# Patient Record
Sex: FEMALE | Race: WHITE | NOT HISPANIC OR LATINO | Employment: UNEMPLOYED | URBAN - METROPOLITAN AREA
[De-identification: names, ages, dates, MRNs, and addresses within clinical notes are randomized per-mention and may not be internally consistent; named-entity substitution may affect disease eponyms.]

---

## 2021-11-24 ENCOUNTER — OFFICE VISIT (OUTPATIENT)
Dept: DERMATOLOGY | Facility: CLINIC | Age: 46
End: 2021-11-24
Payer: COMMERCIAL

## 2021-11-24 VITALS — WEIGHT: 170 LBS | TEMPERATURE: 98.8 F | BODY MASS INDEX: 25.76 KG/M2 | HEIGHT: 68 IN

## 2021-11-24 DIAGNOSIS — L73.9 FOLLICULITIS: ICD-10-CM

## 2021-11-24 DIAGNOSIS — L71.9 ROSACEA: ICD-10-CM

## 2021-11-24 DIAGNOSIS — L70.0 ACNE VULGARIS: ICD-10-CM

## 2021-11-24 DIAGNOSIS — L24.9 IRRITANT HAND DERMATITIS: Primary | ICD-10-CM

## 2021-11-24 PROCEDURE — 87205 SMEAR GRAM STAIN: CPT | Performed by: DERMATOLOGY

## 2021-11-24 PROCEDURE — 87070 CULTURE OTHR SPECIMN AEROBIC: CPT | Performed by: DERMATOLOGY

## 2021-11-24 PROCEDURE — 99204 OFFICE O/P NEW MOD 45 MIN: CPT | Performed by: DERMATOLOGY

## 2021-11-24 RX ORDER — AZELAIC ACID 0.15 G/G
AEROSOL, FOAM TOPICAL
Qty: 50 G | Refills: 3 | Status: SHIPPED | OUTPATIENT
Start: 2021-11-24 | End: 2022-01-25 | Stop reason: SDUPTHER

## 2021-11-24 RX ORDER — CLINDAMYCIN PHOSPHATE 10 UG/ML
LOTION TOPICAL 2 TIMES DAILY
Qty: 60 ML | Refills: 3 | Status: SHIPPED | OUTPATIENT
Start: 2021-11-24 | End: 2022-01-25 | Stop reason: SDUPTHER

## 2021-11-26 ENCOUNTER — TELEPHONE (OUTPATIENT)
Dept: DERMATOLOGY | Facility: CLINIC | Age: 46
End: 2021-11-26

## 2021-11-27 LAB
BACTERIA WND AEROBE CULT: ABNORMAL
GRAM STN SPEC: ABNORMAL

## 2022-01-25 ENCOUNTER — OFFICE VISIT (OUTPATIENT)
Dept: DERMATOLOGY | Facility: CLINIC | Age: 47
End: 2022-01-25
Payer: COMMERCIAL

## 2022-01-25 VITALS — TEMPERATURE: 99 F | WEIGHT: 180 LBS | HEIGHT: 68 IN | BODY MASS INDEX: 27.28 KG/M2

## 2022-01-25 DIAGNOSIS — L70.0 ACNE VULGARIS: ICD-10-CM

## 2022-01-25 DIAGNOSIS — L71.9 ROSACEA: ICD-10-CM

## 2022-01-25 DIAGNOSIS — L73.9 FOLLICULITIS: ICD-10-CM

## 2022-01-25 PROCEDURE — 87205 SMEAR GRAM STAIN: CPT | Performed by: DERMATOLOGY

## 2022-01-25 PROCEDURE — 87070 CULTURE OTHR SPECIMN AEROBIC: CPT | Performed by: DERMATOLOGY

## 2022-01-25 PROCEDURE — 87077 CULTURE AEROBIC IDENTIFY: CPT | Performed by: DERMATOLOGY

## 2022-01-25 PROCEDURE — 99213 OFFICE O/P EST LOW 20 MIN: CPT | Performed by: DERMATOLOGY

## 2022-01-25 RX ORDER — FLUTICASONE PROPIONATE 50 MCG
1 SPRAY, SUSPENSION (ML) NASAL DAILY
COMMUNITY

## 2022-01-25 RX ORDER — AZELAIC ACID 0.15 G/G
AEROSOL, FOAM TOPICAL
Qty: 50 G | Refills: 5 | Status: SHIPPED | OUTPATIENT
Start: 2022-01-25

## 2022-01-25 RX ORDER — CLINDAMYCIN PHOSPHATE 10 UG/ML
LOTION TOPICAL 2 TIMES DAILY
Qty: 60 ML | Refills: 5 | Status: SHIPPED | OUTPATIENT
Start: 2022-01-25

## 2022-01-25 NOTE — PATIENT INSTRUCTIONS
1  ACNE VULGARIS ("COMMON ACNE")        Assessment and Plan:   We reviewed the causes of acne, the kinds of acne, and the expected clinical course   We discussed treatment options ranging from over-the-counter products, topical retinoids, antibiotics, BP, hormonal therapies (OCPs/spironolactone), and isotretinoin (Accutane)   We reviewed specific over-the-counter interventions and medications  Recommended typical hygiene measures including water-based facial products, washing regularly with mild cleanser, and refraining from picking and popping any pimples   Recommended non-comedogenic sunscreen use daily   Expectations of therapy discussed  Side effects, risks and benefits of medications discussed   A comprehensive handout on Acne was provided   The phone number to call in case of questions or concerns (and instructions to stop medications in such a scenario) was provided   After lengthy discussion of etiology and treatment options, we decided to implement the following personalized treatment plan:    Based on a thorough discussion of this condition and the management approach to it (including a comprehensive discussion of the known risks, side effects and potential benefits of treatment), the patient (family) agrees to implement the following specific plan:    --------------------------------------------------------------------------------------  YOUR PERSONALIZED ACNE ACTION PLAN    [facility]    1) SKIN HYGIENE:  In the shower, wash your face, chest and back gently with Cetaphil moisturizing cleanser or Dove Fragrance-free bar  Do not use a luffa or washcloth as these tend to be too irritating to acne-prone skin  2) ANTIBIOTICS:     Continue using prescribed Finacea foam (Azelaic acid)  Apply topically once or twice a day to face area         2   FOLLICULITIS        Assessment and Plan:  Based on a thorough discussion of this condition and the management approach to it (including a comprehensive discussion of the known risks, side effects and potential benefits of treatment), the patient (family) agrees to implement the following specific plan:   Continue using benzoyl peroxide (neutrogena clear pore) in the shower daily or as needed   Continue using clindamycin 1% lotion  Apply topically after showering  Start using Amlactin ultra smoothing cream to rough dry areas as needed to moisturize      Obtained a wound culture of the nose to check for any bacterial growth (staph infection)  What is folliculitis? Folliculitis is the name given to a group of skin conditions in which there are inflamed hair follicles  The result is a tender red spot, often with a surface pustule  Folliculitis may be superficial or deep  It can affect anywhere there are hairs, including chest, back, buttocks, arms and legs  Acne and its variants are also types of folliculitis  What causes folliculitis? Folliculitis can be due to infection, occlusion (blockage), irritation and various skin diseases  Folliculitis due to infection  To determine if folliculitis is due to an infection, swabs should be taken from the pustules for cytology and culture in the laboratory  Bacteria  Bacterial folliculitis is commonly due to Staphylococcus aureus  If the infection involves the deep part of the follicle, it results in a painful boil  Recommended treatment includes careful hygiene, antiseptic cleanser or cream, antibiotic ointment, and/or oral antibiotics  Spa pool folliculitis is due to infection with Pseudomonas aeruginosa, which thrives in inadequately chlorinated warm water  Gram negative folliculitis is a pustular facial eruption also due to infection with Pseudomonas aeruginosa or other similar organisms  When it appears, it usually follows tetracycline treatment of acne, but is quite rare      Yeasts  The most common yeast to cause a folliculitis is Pityrosporum ovale, also known as Malassezia  Malassezia folliculitis (Pityrosporum folliculitis) is an itchy acne-like condition usually affecting the upper trunk of a young adult  Treatment includes avoiding moisturisers, stopping any antibiotics and topical antifungal or oral antifungal medication for several weeks  Candida albicans can also provoke a folliculitis in skin folds (intertrigo) or in the beard area  It is treated with topical or oral antifungal agents  Fungi  Ringworm of the scalp (tinea capitis) usually results in scaling and hair loss, but sometimes results in folliculitis  In [place], cat ringworm (Microsporum canis) is the commonest organism causing scalp fungal infection  Other fungi such as Trichophyton tonsurans are increasingly reported  Treatment is with oral antifungal agents for several months  Viral infections  Folliculitis may caused by herpes simplex virus  This tends to be tender, and resolves without treatment in around 10 days  Severe recurrent attacks may be treated with acyclovir and other antiviral agents  Herpes zoster (the cause of shingles) may also present as folliculitis with painful pustules and crusted spots within a dermatome (an area of skin supplied by a single nerve)  It is treated with hihg-dose acyclovir  Molluscum contagiosum, common in young children, may also cause follicular umbilicated papules, usually clustered in and around a body fold  Molluscum may provoke dermatitis  Parasitic infection  Folliculitis on the face or scalp of older or immunosuppressed adults may be due to colonisation by hair follicle mites (demodex)  This is known as demodicosis  The human infestation, scabies, often provokes folliculitis, as well as non-follicular papules, vesicles and pustules  Folliculitis due to irritation from regrowing hairs  Folliculitis may arise as hairs regrow after shaving, waxing, electrolysis or plucking   Swabs taken from the pustules are sterile ie there is no growth of bacteria or other organisms  In the beard area irritant folliculitis is known as pseudofolliculitis barbae  Irritant folliculitis is also common on the lower legs of women (shaving rash)  It is frequently very itchy  Treatment is by stopping hair removal, and not beginning again for about three months after the folliculitis has settled  To prevent reoccurring irritant folliculitis, use a gentle hair removal method, such as a lady's electric razor  Avoid soap and apply plenty of shaving gel, if using a blade shaver  Folliculitis due to contact reactions  Occlusion  Paraffin-based ointments, moisturisers, and adhesive plasters may all result in a sterile folliculitis  If a moisturiser is needed, choose an oil-free product, as it is less likely to cause occlusion  Chemicals  Coal tar, cutting oils and other chemicals may cause an irritant folliculitis  Avoid contact with the causative product  Topical steroids  Overuse of topical steroids may produce a folliculitis  Perioral dermatitis is a facial folliculitis provoked by moisturisers and topical steroids  Perioral dermatitis is treated with tetracycline antibiotics for six weeks or so  Folliculitis due to immunosuppression  Eosinophilic folliculitis is a specific type of folliculitis that may arise in some immune suppressed individuals such as those infected by human immunodeficiency virus (HIV) or those who have cancer  Folliculitis due to drugs  Folliculitis may be due to drugs, particularly corticosteroids (steroid acne), androgens (male hormones), ACTH, lithium, isoniazid (INH), phenytoin and B-complex vitamins  Protein kinase inhibitors (epidermal growth factor receptor inhibitors) and targeted therapy for metastatic melanoma (vemurafenib, dabrafenib) nearly always result in folliculitis      Folliculitis due to inflammatory skin diseases  Certain uncommon inflammatory skin diseases may cause permanent hair loss and scarring because of deep seated sterile folliculitis  These include:   Lichen planus   Discoid lupus erythematosus   Folliculitis decalvans   Folliculitis keloidalis     Treatment depends on the underlying condition and its severity  A skin biopsy is often necessary to establish the diagnosis  Acne variants   Acne and acne-like or acneform disorders are also forms of folliculitis  These include:   Acne vulgaris   Nodulocystic acne   Rosacea   Scalp folliculitis   Chloracne    The follicular occlusion syndrome refers to:   Hidradenitis suppurativa (acne inversa)   Acne conglobata (a severe form of nodulocystic acne)   Dissecting cellulitis (perifolliculitis capitis abscedens et suffodiens)   Pilonidal sinus  Treatment of the acne variants may include topical therapy as well as long courses of tetracycline antibiotics, isotretinoin (vitamin-A derivative) and in women, antiandrogenic therapy  Buttock folliculitis  Folliculitis affecting the buttocks is quite common and is often nonspecific, ie no specific cause is found  Buttock folliculitis is equally common in males and females   Acute buttock folliculitis is usually bacterial in origin (like boils), resulting in red painful papules and pustules  It clears with antibiotics   Chronic buttock folliculitis does not often cause significant symptoms but it can be very persistent  Although antiseptics, topical acne treatments, peeling agents such as alphahydroxy acids, long courses of oral antibiotics and isotretinoin can help buttock folliculitis, they are not always effective  Hair removal might be worth trying if the affected area is hairy   As regrowth of hair can make it worse, permanent hair reduction by laser or intense pulsed light (IPL) is best

## 2022-01-27 LAB
BACTERIA WND AEROBE CULT: ABNORMAL
GRAM STN SPEC: ABNORMAL

## 2022-01-27 NOTE — RESULT ENCOUNTER NOTE
I discussed that culture grew a gram negative  Typically contamainat     BP wash recommended    I did note that re[eated  cultures may help to see if any pattern of gram negative  etc

## 2022-05-17 ENCOUNTER — OFFICE VISIT (OUTPATIENT)
Dept: DERMATOLOGY | Facility: CLINIC | Age: 47
End: 2022-05-17
Payer: COMMERCIAL

## 2022-05-17 VITALS — HEIGHT: 68 IN | WEIGHT: 182 LBS | BODY MASS INDEX: 27.58 KG/M2 | TEMPERATURE: 97.6 F

## 2022-05-17 DIAGNOSIS — L73.9 FOLLICULITIS: ICD-10-CM

## 2022-05-17 DIAGNOSIS — L70.0 ACNE VULGARIS: Primary | ICD-10-CM

## 2022-05-17 PROCEDURE — 99213 OFFICE O/P EST LOW 20 MIN: CPT | Performed by: DERMATOLOGY

## 2022-05-17 RX ORDER — CLINDAMYCIN PHOSPHATE 10 MG/ML
1 SOLUTION TOPICAL 2 TIMES DAILY
Qty: 30 PAD | Refills: 5 | Status: SHIPPED | OUTPATIENT
Start: 2022-05-17

## 2022-05-17 RX ORDER — DOXYCYCLINE HYCLATE 20 MG
TABLET ORAL
Qty: 60 TABLET | Refills: 2 | Status: SHIPPED | OUTPATIENT
Start: 2022-05-17 | End: 2022-06-09

## 2022-05-17 NOTE — PATIENT INSTRUCTIONS
ACNE VULGARIS ("COMMON ACNE")    Assessment and Plan: We reviewed the causes of acne, the kinds of acne, and the expected clinical course  We discussed treatment options ranging from over-the-counter products, topical retinoids, antibiotics, BP, hormonal therapies (OCPs/spironolactone), and isotretinoin (Accutane)  We reviewed specific over-the-counter interventions and medications  Recommended typical hygiene measures including water-based facial products, washing regularly with mild cleanser, and refraining from picking and popping any pimples  Recommended non-comedogenic sunscreen use daily  Expectations of therapy discussed  Side effects, risks and benefits of medications discussed  A comprehensive handout on Acne was provided  The phone number to call in case of questions or concerns (and instructions to stop medications in such a scenario) was provided  After lengthy discussion of etiology and treatment options, we decided to implement the following personalized treatment plan:    Based on a thorough discussion of this condition and the management approach to it (including a comprehensive discussion of the known risks, side effects and potential benefits of treatment), the patient (family) agrees to implement the following specific plan:    --------------------------------------------------------------------------------------  YOUR PERSONALIZED ACNE ACTION PLAN    [address] [place] [address] Pkw[address]:  In the shower, wash your face, chest and back gently with Cetaphil moisturizing cleanser or Dove Fragrance-free bar  Do not use a luffa or washcloth as these tend to be too irritating to acne-prone skin  ANTIMICROBIAL BENZOYL PEROXIDE:    Neutrogena Clear Pore (Benzoyl Peroxide 3% wash): In the shower, apply this medication to your face, chest and back  Leave this wash on your skin for about 5 minutes and then rinse it off completely while in the shower   If you do not rinse it off completely, then it will bleach your towels or clothing  This medication is now available without prescription (over-the-counter) in most drug stores or at [email or web address] for about $7 a bottle  PenOxyl wash: Apply on face leave in for 1-2 minutes and completely rinse off    Sulfacetamide sodium-sulfur wash: Apply to face daily  You may use any brand of benzoyl peroxide 10% wash over the counter    ANTIBIOTICS:    Doxycycline Take 1 tablet with a full glass of water and food    Consider Spironolactone if acne persists    EVENING ROUTINE    SKIN HYGIENE:  In the shower, wash your face, chest and back gently with Cetaphil moisturizing cleanser or Dove Fragrance-free bar  Do not use a lufa or washcloth as these tend to be too irritating to acne-prone skin  ANTIBIOTICS:    Doxycycline Take 1 tablet with a full glass of water and food     3)  Continue with applying Finacea 15 % foam once to twice daily    ACNE:  WHAT ZIT ALL ABOUT? WHY DO I HAVE ACNE/PIMPLES? Your skin is made of layers  To keep the skin from becoming dry and cracked, the skin needs oil  The oil is made in little wells in the deeper layers in the skin  People with acne have glands that make more oil and are more easily plugged, causing the glands to swell  Hormones, bacteria and your inherited tendency to have acne all play a role  The medical term for pimples is acne or acne vulgaris (vulgaris means common)  Most people get some acne  Acne does not come from being dirty  Instead, it is an expected consequence of changes that occur during normal growth and development  Hormones, bacteria, and your family's tendency to have acne may all play a role  Whiteheads or blackheads are openings of the glands (glands are the oil factories) onto the surface of the skin  Blackheads are not caused by dirt blocking the pores; instead, they result from the oxidation reaction of oil and skin in the pores with the air (like a rust reaction)  WHAT ABOUT STRESS? Stress does not cause acne but it can make it worse  Make sure you get enough sleep and daily exercise! WHAT ABOUT FOODS/DIET? Try to eat a balanced, healthy diet  Some people feel that certain foods worsen their acne  While there aren't many studies available on this question, severe dietary changes are unlikely to help your acne and may be harmful to the health of your skin  If you find that a certain food seems to aggravate your acne, you may consider avoiding that food  Discuss this with your physician! WHAT CAUSES MY ACNE? There are four contributors to acne--the body's natural oil (sebum), clogged pores, bacteria (with the scientific name Propionibacterium acnes, or P  acnes, for short), and the body's reaction to the bacteria living in the clogged pores (which causes inflammation)  Here's what happens:    Sebum is produced in the normal oil-making glands in the deeper layers of the skin and reaches the surface through the skin's pores  An increase in certain hormones occurs around the time of puberty, and these hormones trigger the oil glands to produce increased amounts of sebum  Pores with excess oil tend to become clogged more easily  At the same time, P  acnes--one of the many types of bacteria that normally live on everyone's skin--thrives in the excess oil and causes a skin reaction (inflammation)  If a pore is clogged close to the surface, there is little inflammation  However, this results in the formation of whiteheads (closed comedones) or blackheads (open comedones) at the surface of the skin  A plug that extends to, or forms a little deeper in the pore, or one that enlarges or ruptures may cause more inflammation  The result is red bumps (papules) and pus-filled pimples (pustules)  If plugging happens in the deepest skin layer, the inflammation may be even more severe, resulting in the formation of nodules or cysts   When these types of acne heal, they may leave behind discolored areas or true scars  SKIN HYGIENE:  HOW SHOULD I [facility] MY SKIN? Acne does not come from being dirty, however, washing your face is part of taking good care of your skin and will help keep your face clear  Good skin hygiene is, therefore, critical to support any acne treatment plan  Here are several specific suggestions for practicing good skin hygiene and keeping your skin looking its best:    You should wash acne-prone skin TWICE A DAY: Once in the morning and once in the evening  This does include any showers you take that day, so do not overdo it! Do not scrub the skin with a washcloth or loofah as these can irritate and inflame your acne  Acne does not come from dirt, so it is not necessary to scrub the skin clean  In fact, scrubbing may lead to dryness and irritation that makes the acne even worse and harder for patients to tolerate acne medications  Use a gentle facial moisturizing cleanser (Cetaphil Moisturizing Cleanser or Dove Fragrance-Free bar)  Avoid using soaps like Miracle Roman, [address], [address], or soft/liquid soaps as these products will dry your skin  Do not use any over-the-counter acne washes without your doctor's specific instruction to do so  These products often contain salicylic acid or benzoyl peroxide  These ingredients can be helpful in clearing oil from the skin and reducing bacteria, but they may also be drying and can add to irritation  Do not use exfoliating products with microbeads or brushes as these can cause irritation to the skin  Facials and other treatments to remove, squeeze, or clean out pores are not recommended  Manipulating the skin in this way can make acne worse and can lead to severe infections and/or scarring  It also increases the likelihood that the skin will not be able to tolerate acne medications     Try not to pop pimples or pick at your acne as this can delay healing and may result in scarring or skin color changes (dark spots) that are often more noticeable than the acne itself  Picking/popping acne can also cause a serious skin infection  Wash or change your pillow case once to twice a week, especially if you use products in your hair  Wash the skin as soon as possible after playing sports or other activities that cause a lot of sweating  Also, pay attention to how your sports equipment (shoulder pads, helmet strap, etc ) might be making your acne worse  When you use makeup, moisturizer, or sunscreen make sure that these products are labeled non-comedogenic, or won't clog pores, or won't cause acne         SHOULD I TREAT MY ACNE? There are a number of other skin conditions that can look like acne  If there is any question about the diagnosis, then the person should be evaluated by a board certified pediatric and adolescent dermatologist   A physician should examine any child with acne who is between the ages of 3and 9years of age, as acne in this mid-childhood age group is not normal and may signal an underlying problem  If a preadolescent (9to 6years of age) or adolescent (15to 25years of age) has mild acne and the condition is not bothersome to the individual, proper and regular skin care (what your doctor may call skin hygiene) may be all that is needed at this point  Many people do, however, need specific acne medications to help their skin look and feel its best  Your doctor will tell you if you are one of these people  If so, you may be advised to use an over-the-counter or prescription medication that is applied to the skin (a topical medication) or if the addition of an oral medication (a medication taken by [employer]) is needed  The good news is that the medications work well when used properly! Some specific factors that may influence the choice of acne therapy include:    Severity   The number and type of skin lesions (papules or comedones) and the degree of inflammation (mild, moderate or severe)  Scarring  Scarring is most common when acne is severe, but it can happen even in children with mild acne  Impact  If a child is experiencing emotional complications because of the acne or is experiencing negative comments from other children  Cost of the acne medications  An acne expert can help to keep out of pocket costs to a minimum by utilizing the correct medications and the least expensive options  The patient's skin type (oily versus dry or combination skin, for example)  Potential side effects of the medication  The ease or overall complexity of the treatment plan or medication  WHAT ACNE TREATMENTS ARE AVAILABLE? Medications for acne try to stop the formation of new pimples by reducing or removing the oil, bacteria, and other things (like dead skin cells) that clog the pores  They can also decrease the inflammation or irritation response of the skin to bacteria  It may take from 6 to 8 weeks (about 2 months!) before you see any improvement and know if the medication is effective  It takes the layers of skin this long to regenerate  Remember, these medications do not cure the condition--the acne improves because of the medication  Therefore, treatment must be continued in order to prevent the return of acne lesions  There are many types of acne treatments  Some are applied to the skin (topical medications) and some are taken by mouth (oral medications)  In most cases of mild acne, the doctor will start with a topical medication  There are many different topical medications that are helpful for acne  If acne is more severe and it does not respond adequately to a topical medication, or if it covers large body surface areas such as the back and/or chest, oral antibiotics such as Doxycycline or Minocycline and/or oral hormone therapy such as Oral Contraceptive Pills or Spironolactone may be prescribed   In the most severe cases, isotretinoin (Accutane) may be used  In general, it is usually best to start with acne medications that are least likely to cause side effects but are at the same time capable of addressing the specific causes for the acne  Some patients have a good result with just one medication, but many will need to use a combination of treatments: two or more different topical agents or an oral medication plus a topical medication  Another treatment used for acne may include corticosteroid injections, which are used to help relieve pain, decrease the size, and encourage the healing of large, inflamed acne nodules  Also, dermatologists sometimes perform acne surgery, using a fine needle, a pointed blade, or an instrument known as a comedone extractor to mechanically clean out clogged pores  One must always weigh the risk for inducing a scar with the potential benefits of any procedure  Prior treatment with topical retinoids can loosen whiteheads and blackheads and make it easier to physically remove such lesions  Heat-based devices, and light and laser therapy are being studied to see whether there is any role for such treatments in mild to moderate acne  At this time, there is not enough evidence to make general recommendations about their use  TOPICAL ACNE MEDICATIONS    WHAT KIND OF TOPICALS ARE THERE? Benzoyl peroxide (BP) helps to fight inflammation and is anti-microbial (kills bacteria, viruses, and other microorganisms) and is believed to help prevent resistance of bacteria to topical antibiotics  A benzoyl peroxide wash may be recommended for use on large areas such as the chest and/or back  Mild irritation and dryness are common when first using benzoyl peroxide-containing products  Be careful because benzoyl peroxide can bleach towels and clothing! Retinoids (such as adapalene, tretinoin, or tazarotene) unplug the oil glands by helping peel away the layers of skin and other things plugging the opening of the glands  Mild irritation and dryness are common when first using these products  Facial waxing and other skin procedures can lead to excessive irritation and should be avoided during retinoid therapy  Antibiotics fight bacteria and help decrease inflammation  Topical antibiotics commonly used in acne include clindamycin, erythromycin, and combination agents (such as clindamycin/benzoyl peroxide or erythromycin/benzoyl peroxide)  Mild irritation and dryness are common when first using these products  Typically, topical antibiotics should not be used alone as treatment for acne  Other topical agents include salicylic acid, azelaic acid, dapsone, and sulfacetamide  Mild irritation and dryness can also occur when first using these products  USING YOUR TOPICAL TREATMENTS LIKE A PRO  Apply topical medications only to clean, dry skin  Topical medications may lead to significant dryness of the affected areas  To minimize this, wait 15-20 minutes after washing before applying your topical medication  These medications work deep in the skin to prevent new breakouts  Spot treatment of individual pimples does not do much  When applying topical medications to the face, use the 5-dot method  Start by placing a small pea-sized amount of the medication on your finger  Then, place dots in each of five locations of your face: Mid-forehead, each cheek, nose, and chin  Next, rub the medication into the entire area of skin - not just on individual pimples! Try to avoid the delicate skin around your eyes and corners of your mouth  The medications are not magic! They take weeks if not months to work  Be patient and use your medicine on a daily basis or as directed for six weeks before asking if your skin looks better  Try not to miss more than one or two days each week when using your medications  If you are starting a new medication, then try using it every other night or even every third night   Gradually work up to [employer] a day   This will give your skin time to adjust   The same medications often come in various forms or formulations: Creams, ointments, lotions, gels, microspheres, or foams  Use the formulation that has been recommended and don't switch to other forms unless instructed  Some forms (such as alcohol based gels) may be more drying and less tolerable for certain skin types  Sometimes individual medications are not as effective as a combination of two or more agents  The doctor may need to try several medications or combinations before finding the one that is best for that patient  Moisturizer, sunscreen, and make-up may be used in conjunction with topical acne medications  In general, acne medications are applied first so they may directly contact the skin  Ask your physician to review specific application instructions! It is especially important to always use sunscreen when using a topical retinoid or oral antibiotic  These drugs can make your skin more sensitive to the sun  In general, sunscreen gets applied AFTER any acne medications  Don't stop using your acne medications just because your acne got better  Remember, the acne is better because of the medication, and prevention is the nelson to treatment  ORAL ACNE MEDICATIONS    ORAL ANTIBIOTICS  Antibiotics include tetracycline-class medicines (which include the most commonly used oral antibiotics for acne, minocycline, and doxycycline), erythromycin, trimethoprim-sulfamethoxazole, and occasionally cephalexin or azithromycin  These drugs may decrease bacteria and inflammation, and they are most effective for moderate-to-severe inflammatory acne  A product containing benzoyl peroxide should be used along with these antibiotics to help decrease the possibility of microbial resistance  Always take your acne pills with lots of water! A pill stuck in your throat can cause significant burning and irritation     Drink a full glass of water to ensure the pill gets into your stomach  Avoid popping a pill right before bed, and stay upright for at least 1 hour after taking a pill  DOXYCYCLINE   This medication is usually taken ONCE or TWICE per day, as instructed by your physician  NOTE: Always take this medication with lots of water! A pill stuck in the throat can cause significant burning and irritation  Avoid popping a pill right before bed & stay upright for at least one hour after taking a pill  WARNING: Doxycycline increases your sensitivity to the sun, so practice excellent sun protection! If you notice any of the following, stop using the medication and notify your health care provider: headaches; blurred vision; dizziness; sun sensitivity; heartburn-stomach pain; irritation of the esophagus; darkening of scars, gums, or teeth (more often with minocycline); nail changes; yellowing of the eyes or skin (indicating possible liver disease); joint pains-and flu-like symptoms  Taking oral antibiotics with food may help with symptoms of upset stomach  COMMON SIDE EFFECTS: Headaches; dizziness; sun sensitivity; irritation of the throat; discoloration of scars, gums, or teeth; nail changes  HAVING PROBLEMS WITH ANY OF YOUR TREATMENTS? You should not be able to see any of the medicines on your face  If you can see a white film on your skin after you apply the medication, there is too much medicine in that area and you need to apply a thinner coat and make sure it is spread evenly on your face  If your skin gets too dry, you can apply a light (non-comedogenic) moisturizer on top of your medicine or you may switch to using the medicine every other day instead of every day  If your skin is still too irritated, you may need to switch to a milder medication  If your skin is red and very itchy, you may be allergic to the medication and you should stop using it        COMMON POSSIBLE SIDE EFFECTS OF MEDICATIONS    Retinoids - dryness, redness, increased sun sensitivity  Benzoyl peroxide - drying, redness, bleaching of clothes, towels and sheets, allergy  Doxycycline - headaches; dizziness; irritation of the throat; nail changes; discoloration of teeth  Sun sensitivity - even if you have dark skin, this medicine can make you burn more easily  Make sure you protect yourself from the sun, either by avoiding being outside between 11 AM and 3 PM, wearing and reapplying sunscreen/sunblock, or wearing sun protective clothing  Nausea/vomiting - if you experience nausea with this medication, take it with food  WHEN AND WHERE TO CALL WITH CONCERNS  We are here to help! If you experience any unusual symptoms, then stop taking or using the medication and call our office at [phone] (SKIN)  It is better to be safe than to be sorry! FOLLICULITIS    Assessment and Plan:  Based on a thorough discussion of this condition and the management approach to it (including a comprehensive discussion of the known risks, side effects and potential benefits of treatment), the patient (family) agrees to implement the following specific plan:  Continue to apply clindamycin lotion twice daily to affected areas    What is folliculitis? Folliculitis is the name given to a group of skin conditions in which there are inflamed hair follicles  The result is a tender red spot, often with a surface pustule  Folliculitis may be superficial or deep  It can affect anywhere there are hairs, including chest, back, buttocks, arms and legs  Acne and its variants are also types of folliculitis  What causes folliculitis? Folliculitis can be due to infection, occlusion (blockage), irritation and various skin diseases  Folliculitis due to infection  To determine if folliculitis is due to an infection, swabs should be taken from the pustules for cytology and culture in the laboratory  Bacteria  Bacterial folliculitis is commonly due to Staphylococcus aureus   If the infection involves the deep part of the follicle, it results in a painful boil  Recommended treatment includes careful hygiene, antiseptic cleanser or cream, antibiotic ointment, and/or oral antibiotics  Spa pool folliculitis is due to infection with Pseudomonas aeruginosa, which thrives in inadequately chlorinated warm water  Gram negative folliculitis is a pustular facial eruption also due to infection with Pseudomonas aeruginosa or other similar organisms  When it appears, it usually follows tetracycline treatment of acne, but is quite rare  Yeasts  The most common yeast to cause a folliculitis is Pityrosporum ovale, also known as Malassezia  Malassezia folliculitis (Pityrosporum folliculitis) is an itchy acne-like condition usually affecting the upper trunk of a young adult  Treatment includes avoiding moisturisers, stopping any antibiotics and topical antifungal or oral antifungal medication for several weeks  Candida albicans can also provoke a folliculitis in skin folds (intertrigo) or in the beard area  It is treated with topical or oral antifungal agents  Fungi  Ringworm of the scalp (tinea capitis) usually results in scaling and hair loss, but sometimes results in folliculitis  In [place], cat ringworm (Microsporum canis) is the commonest organism causing scalp fungal infection  Other fungi such as Trichophyton tonsurans are increasingly reported  Treatment is with oral antifungal agents for several months  Viral infections  Folliculitis may caused by herpes simplex virus  This tends to be tender, and resolves without treatment in around 10 days  Severe recurrent attacks may be treated with acyclovir and other antiviral agents  Herpes zoster (the cause of shingles) may also present as folliculitis with painful pustules and crusted spots within a dermatome (an area of skin supplied by a single nerve)  It is treated with hihg-dose acyclovir    Molluscum contagiosum, common in young children, may also cause follicular umbilicated papules, usually clustered in and around a body fold  Molluscum may provoke dermatitis  Parasitic infection  Folliculitis on the face or scalp of older or immunosuppressed adults may be due to colonisation by hair follicle mites (demodex)  This is known as demodicosis  The human infestation, scabies, often provokes folliculitis, as well as non-follicular papules, vesicles and pustules  Folliculitis due to irritation from regrowing hairs  Folliculitis may arise as hairs regrow after shaving, waxing, electrolysis or plucking  Swabs taken from the pustules are sterile ie there is no growth of bacteria or other organisms  In the beard area irritant folliculitis is known as pseudofolliculitis barbae  Irritant folliculitis is also common on the lower legs of women (shaving rash)  It is frequently very itchy  Treatment is by stopping hair removal, and not beginning again for about three months after the folliculitis has settled  To prevent reoccurring irritant folliculitis, use a gentle hair removal method, such as a lady's electric razor  Avoid soap and apply plenty of shaving gel, if using a blade shaver  Folliculitis due to contact reactions  Occlusion  Paraffin-based ointments, moisturisers, and adhesive plasters may all result in a sterile folliculitis  If a moisturiser is needed, choose an oil-free product, as it is less likely to cause occlusion  Chemicals  Coal tar, cutting oils and other chemicals may cause an irritant folliculitis  Avoid contact with the causative product  Topical steroids  Overuse of topical steroids may produce a folliculitis  Perioral dermatitis is a facial folliculitis provoked by moisturisers and topical steroids  Perioral dermatitis is treated with tetracycline antibiotics for six weeks or so      Folliculitis due to immunosuppression  Eosinophilic folliculitis is a specific type of folliculitis that may arise in some immune suppressed individuals such as those infected by human immunodeficiency virus (HIV) or those who have cancer  Folliculitis due to drugs  Folliculitis may be due to drugs, particularly corticosteroids (steroid acne), androgens (male hormones), ACTH, lithium, isoniazid (INH), phenytoin and B-complex vitamins  Protein kinase inhibitors (epidermal growth factor receptor inhibitors) and targeted therapy for metastatic melanoma (vemurafenib, dabrafenib) nearly always result in folliculitis  Folliculitis due to inflammatory skin diseases  Certain uncommon inflammatory skin diseases may cause permanent hair loss and scarring because of deep seated sterile folliculitis  These include:  Lichen planus  Discoid lupus erythematosus  Folliculitis decalvans  Folliculitis keloidalis     Treatment depends on the underlying condition and its severity  A skin biopsy is often necessary to establish the diagnosis  Acne variants   Acne and acne-like or acneform disorders are also forms of folliculitis  These include:  Acne vulgaris  Nodulocystic acne  Rosacea  Scalp folliculitis  Chloracne    The follicular occlusion syndrome refers to:  Hidradenitis suppurativa (acne inversa)  Acne conglobata (a severe form of nodulocystic acne)  Dissecting cellulitis (perifolliculitis capitis abscedens et suffodiens)  Pilonidal sinus  Treatment of the acne variants may include topical therapy as well as long courses of tetracycline antibiotics, isotretinoin (vitamin-A derivative) and in women, antiandrogenic therapy  Buttock folliculitis  Folliculitis affecting the buttocks is quite common and is often nonspecific, ie no specific cause is found  Buttock folliculitis is equally common in males and females  Acute buttock folliculitis is usually bacterial in origin (like boils), resulting in red painful papules and pustules  It clears with antibiotics  Chronic buttock folliculitis does not often cause significant symptoms but it can be very persistent   Although antiseptics, topical acne treatments, peeling agents such as alphahydroxy acids, long courses of oral antibiotics and isotretinoin can help buttock folliculitis, they are not always effective  Hair removal might be worth trying if the affected area is hairy   As regrowth of hair can make it worse, permanent hair reduction by laser or intense pulsed light (IPL) is best

## 2022-05-17 NOTE — PROGRESS NOTES
[facility] Follow Up Note    Patient Name: Mikey Mckay  Encounter Date: 05/17/22    Today's Chief Concerns:  [place] Concern #1:  FOLLOW UP TO ACNE   Concern#2: Mole check      Current Medications:    Current Outpatient Medications:     amphetamine-dextroamphetamine (ADDERALL XR) 10 MG 24 hr capsule, dextroamphetamine-amphetamine ER 10 mg 24hr capsule,extend release, Disp: , Rfl:     Azelaic Acid (Finacea) 15 % FOAM, Apply topically once or twice a day to a face area , Disp: 50 g, Rfl: 5    clindamycin (CLEOCIN T) 1 % lotion, Apply topically 2 (two) times a day Apply topically to affected areas twice a day , Disp: 60 mL, Rfl: 5    fluticasone (FLONASE) 50 mcg/act nasal spray, 1 spray into each nostril daily Using as needed, Disp: , Rfl:     Junel FE 1 5/30 1 5-30 MG-MCG tablet, ONE PILL DAILY, SKIP THE INACTIVE PILLS AND RESTART A NEW PACK IMMEDIATELY, Disp: , Rfl:     levothyroxine 50 mcg tablet, levothyroxine 50 mcg tablet, Disp: , Rfl:     Multiple Vitamin (MULTI-VITAMIN DAILY PO), Take by mouth, Disp: , Rfl:     CONSTITUTIONAL:   Vitals:    05/17/22 1459   Temp: 97 6 °F (36 4 °C)   TempSrc: Temporal   Weight: 82 6 kg (182 lb)   Height: 5' 8" (1 727 m)     Specific Alerts:    Have you been seen by a [facility]ologist in the last 3 years? YES    Are you pregnant or planning to become pregnant? No    Are you currently or planning to be nursing or breast feeding? No    Allergies   Allergen Reactions    Benzoyl Peroxide Rash     Other reaction(s): itching, swelling  Other reaction(s): itching, swelling         May we call your Preferred Phone number to discuss your specific medical information? YES    May we leave a detailed message that includes your specific medical information? YES    Have you traveled outside of the [facility] in the past 3 months? No    Do you currently have a pacemaker or defibrillator?  No    Do you have any artificial heart valves, joints, plates, screws, rods, stents, pins, etc? No   - If Yes, were any placed within the last 2 years? Do you require any medications prior to a surgical procedure? No    Are you taking any medications that cause you to bleed more easily ("blood thinners") No    Have you ever experienced a rapid heartbeat with epinephrine? No    Have you ever been treated with "gold" (gold sodium thiomalate) therapy? No    [facility] can help with wrinkles, "laugh lines," facial volume loss, "double chin," "love handles," age spots, and more  Are you interested in learning today about some of the skin enhancement procedures that we offer? (If Yes, please provide more detail) No    Review of Systems:  Have you recently had or currently have any of the following?     · Fever or chills: No  · Night Sweats: YES, thyroid  · Headaches: YES  · Weight Gain: YES  · Weight Loss: No  · Blurry Vision: YES  · Nausea: No  · Vomiting: No  · Diarrhea: No  · Blood in Stool: No  · Abdominal Pain: No  · Itchy Skin: YES  · Painful Joints: No  · Swollen Joints: No  · Muscle Pain: No  · Irregular Mole: No  · Sun Burn: No  · Dry Skin: YES  · Skin Color Changes: No  · Scar or Keloid: No  · Cold Sores/Fever Blisters: No  · Bacterial Infections/MRSA: No  · Anxiety: No  · Depression: No  · Suicidal or Homicidal Thoughts: No      PSYCH: Normal mood and affect  EYES: Normal conjunctiva  ENT: Normal lips and oral mucosa  CARDIOVASCULAR: No edema  RESPIRATORY: Normal respirations  HEME/LYMPH/IMMUNO:  No regional lymphadenopathy except as noted below in ASSESSMENT AND PLAN BY DIAGNOSIS    FULL ORGAN SYSTEM SKIN EXAM (SKIN)   Hair, Scalp, Ears, Face Normal except as noted below in Assessment   Neck, Cervical Chain Nodes Normal except as noted below in Assessment   Right Arm/Hand/Fingers Normal except as noted below in Assessment   Left Arm/Hand/Fingers Normal except as noted below in Assessment   Chest/Axillae Viewed areas Normal except as noted below in Assessment   Abdomen, Umbilicus Normal except as noted below in Assessment   Back/Spine Normal except as noted below in Assessment   Buttocks Viewed areas Normal except as noted below in Assessment   Right Leg, Foot, Toes Normal except as noted below in Assessment   Left Leg, Foot, Toes Normal except as noted below in Assessment       ACNE VULGARIS ("COMMON ACNE")    Physical Exam:   Psychiatric/Mood:   Anatomic Location Affected: Face T-zone area   Morphological Description:  o Open/Closed Comedones:  - No evidence ("Clear")  o Inflammatory Papules/Pustules:  - Several ("Moderate")  o Nodules:  - No evidence ("Clear")  o Scarring:  - Several ("Moderate")  o Excoriations:  - No evidence ("Clear")  o Local Skin Redness/Erythema:  - No evidence ("Clear")  o Local Skin Dryness/Scaling:  - No evidence ("Clear")  o Local Skin Dyspigmentation:  - No evidence ("Clear")   Pertinent Positives:   Pertinent Negatives: Additional History of Present Condition:  Patient using Finacea 15 % foam     Assessment and Plan:   We reviewed the causes of acne, the kinds of acne, and the expected clinical course   We discussed treatment options ranging from over-the-counter products, topical retinoids, antibiotics, BP, hormonal therapies (OCPs/spironolactone), and isotretinoin (Accutane)   We reviewed specific over-the-counter interventions and medications  Recommended typical hygiene measures including water-based facial products, washing regularly with mild cleanser, and refraining from picking and popping any pimples   Recommended non-comedogenic sunscreen use daily   Expectations of therapy discussed  Side effects, risks and benefits of medications discussed   A comprehensive handout on Acne was provided   The phone number to call in case of questions or concerns (and instructions to stop medications in such a scenario) was provided     After lengthy discussion of etiology and treatment options, we decided to implement the following personalized treatment plan:    Based on a thorough discussion of this condition and the management approach to it (including a comprehensive discussion of the known risks, side effects and potential benefits of treatment), the patient (family) agrees to implement the following specific plan:    --------------------------------------------------------------------------------------  YOUR PERSONALIZED ACNE ACTION PLAN    [facility]    1) SKIN HYGIENE:  In the shower, wash your face, chest and back gently with Cetaphil moisturizing cleanser or Dove Fragrance-free bar  Do not use a luffa or washcloth as these tend to be too irritating to acne-prone skin  2) ANTIMICROBIAL BENZOYL PEROXIDE:   None   Neutrogena Clear Pore (Benzoyl Peroxide 3% wash): In the shower, apply this medication to your face, chest and back  Leave this wash on your skin for about 5 minutes and then rinse it off completely while in the shower  If you do not rinse it off completely, then it will bleach your towels or clothing  This medication is now available without prescription (over-the-counter) in most drug stores or at [employer] for about $7 a bottle   PenOxyl wash: Apply on face leave in for 1-2 minutes and completely rinse off     Sulfacetamide sodium-sulfur wash: Apply to face daily   You may use any brand of benzoyl peroxide 10% wash over the counter    3) ANTIBIOTICS:     Doxycycline Take 1 tablet with a full glass of water and food     Consider Spironolactone if acne persists    EVENING ROUTINE    1) SKIN HYGIENE:  In the shower, wash your face, chest and back gently with Cetaphil moisturizing cleanser or Dove Fragrance-free bar  Do not use a lufa or washcloth as these tend to be too irritating to acne-prone skin      2) ANTIBIOTICS:     Doxycycline Take 1 tablet with a full glass of water and food     3)   Continue with applying Finacea 15 % foam once to twice daily    ACNE:  WHAT ZIT ALL ABOUT? WHY DO I HAVE ACNE/PIMPLES? Your skin is made of layers  To keep the skin from becoming dry and cracked, the skin needs oil  The oil is made in little wells in the deeper layers in the skin  People with acne have glands that make more oil and are more easily plugged, causing the glands to swell  Hormones, bacteria and your inherited tendency to have acne all play a role  The medical term for pimples is acne or acne vulgaris (vulgaris means common)  Most people get some acne  Acne does not come from being dirty  Instead, it is an expected consequence of changes that occur during normal growth and development  Hormones, bacteria, and your family's tendency to have acne may all play a role  Whiteheads or blackheads are openings of the glands (glands are the oil factories) onto the surface of the skin  Blackheads are not caused by dirt blocking the pores; instead, they result from the oxidation reaction of oil and skin in the pores with the air (like a rust reaction)  WHAT ABOUT STRESS? Stress does not cause acne but it can make it worse  Make sure you get enough sleep and daily exercise! WHAT ABOUT FOODS/DIET? Try to eat a balanced, healthy diet  Some people feel that certain foods worsen their acne  While there aren't many studies available on this question, severe dietary changes are unlikely to help your acne and may be harmful to the health of your skin  If you find that a certain food seems to aggravate your acne, you may consider avoiding that food  Discuss this with your physician! WHAT CAUSES MY ACNE? There are four contributors to acne--the body's natural oil (sebum), clogged pores, bacteria (with the scientific name Propionibacterium acnes, or P  acnes, for short), and the body's reaction to the bacteria living in the clogged pores (which causes inflammation)   Here's what happens:     Sebum is produced in the normal oil-making glands in the deeper layers of the skin and reaches the surface through the skin's pores  An increase in certain hormones occurs around the time of puberty, and these hormones trigger the oil glands to produce increased amounts of sebum   Pores with excess oil tend to become clogged more easily   At the same time, P  acnes--one of the many types of bacteria that normally live on everyone's skin--thrives in the excess oil and causes a skin reaction (inflammation)   If a pore is clogged close to the surface, there is little inflammation  However, this results in the formation of whiteheads (closed comedones) or blackheads (open comedones) at the surface of the skin   A plug that extends to, or forms a little deeper in the pore, or one that enlarges or ruptures may cause more inflammation  The result is red bumps (papules) and pus-filled pimples (pustules)   If plugging happens in the deepest skin layer, the inflammation may be even more severe, resulting in the formation of nodules or cysts  When these types of acne heal, they may leave behind discolored areas or true scars  SKIN HYGIENE:  HOW SHOULD I [address] MY SKIN? Acne does not come from being dirty, however, washing your face is part of taking good care of your skin and will help keep your face clear  Good skin hygiene is, therefore, critical to support any acne treatment plan  Here are several specific suggestions for practicing good skin hygiene and keeping your skin looking its best:     You should wash acne-prone skin TWICE A DAY: Once in the morning and once in the evening  This does include any showers you take that day, so do not overdo it!  Do not scrub the skin with a washcloth or loofah as these can irritate and inflame your acne  Acne does not come from dirt, so it is not necessary to scrub the skin clean  In fact, scrubbing may lead to dryness and irritation that makes the acne even worse and harder for patients to tolerate acne medications      Use a gentle facial moisturizing cleanser (Cetaphil Moisturizing Cleanser or Dove Fragrance-Free bar)  Avoid using soaps like [address], [address], or soft/liquid soaps as these products will dry your skin   Do not use any over-the-counter acne washes without your doctor's specific instruction to do so  These products often contain salicylic acid or benzoyl peroxide  These ingredients can be helpful in clearing oil from the skin and reducing bacteria, but they may also be drying and can add to irritation   Do not use exfoliating products with microbeads or brushes as these can cause irritation to the skin   Facials and other treatments to remove, squeeze, or clean out pores are not recommended  Manipulating the skin in this way can make acne worse and can lead to severe infections and/or scarring  It also increases the likelihood that the skin will not be able to tolerate acne medications   Try not to pop pimples or pick at your acne as this can delay healing and may result in scarring or skin color changes (dark spots) that are often more noticeable than the acne itself  Picking/popping acne can also cause a serious skin infection   Wash or change your pillow case once to twice a week, especially if you use products in your hair   Wash the skin as soon as possible after playing sports or other activities that cause a lot of sweating  Also, pay attention to how your sports equipment (shoulder pads, helmet strap, etc ) might be making your acne worse   When you use makeup, moisturizer, or sunscreen make sure that these products are labeled non-comedogenic, or won't clog pores, or won't cause acne         SHOULD I TREAT MY ACNE? There are a number of other skin conditions that can look like acne   If there is any question about the diagnosis, then the person should be evaluated by a board certified pediatric and adolescent dermatologist   A physician should examine any child with acne who is between the ages of 3and 9years of age, as acne in this mid-childhood age group is not normal and may signal an underlying problem  If a preadolescent (9to 6years of age) or adolescent (15to 25years of age) has mild acne and the condition is not bothersome to the individual, proper and regular skin care (what your doctor may call skin hygiene) may be all that is needed at this point  Many people do, however, need specific acne medications to help their skin look and feel its best  Your doctor will tell you if you are one of these people  If so, you may be advised to use an over-the-counter or prescription medication that is applied to the skin (a topical medication) or if the addition of an oral medication (a medication taken by [employer]) is needed  The good news is that the medications work well when used properly! Some specific factors that may influence the choice of acne therapy include:     Severity  The number and type of skin lesions (papules or comedones) and the degree of inflammation (mild, moderate or severe)   Scarring  Scarring is most common when acne is severe, but it can happen even in children with mild acne   Impact  If a child is experiencing emotional complications because of the acne or is experiencing negative comments from other children   Cost of the acne medications  An acne expert can help to keep out of pocket costs to a minimum by utilizing the correct medications and the least expensive options   The patient's skin type (oily versus dry or combination skin, for example)   Potential side effects of the medication   The ease or overall complexity of the treatment plan or medication  WHAT ACNE TREATMENTS ARE AVAILABLE? Medications for acne try to stop the formation of new pimples by reducing or removing the oil, bacteria, and other things (like dead skin cells) that clog the pores   They can also decrease the inflammation or irritation response of the skin to bacteria  It may take from 6 to 8 weeks (about 2 months!) before you see any improvement and know if the medication is effective  It takes the layers of skin this long to regenerate  Remember, these medications do not cure the condition--the acne improves because of the medication  Therefore, treatment must be continued in order to prevent the return of acne lesions  There are many types of acne treatments  Some are applied to the skin (topical medications) and some are taken by mouth (oral medications)  In most cases of mild acne, the doctor will start with a topical medication  There are many different topical medications that are helpful for acne  If acne is more severe and it does not respond adequately to a topical medication, or if it covers large body surface areas such as the back and/or chest, oral antibiotics such as Doxycycline or Minocycline and/or oral hormone therapy such as Oral Contraceptive Pills or Spironolactone may be prescribed  In the most severe cases, isotretinoin (Accutane) may be used  In general, it is usually best to start with acne medications that are least likely to cause side effects but are at the same time capable of addressing the specific causes for the acne  Some patients have a good result with just one medication, but many will need to use a combination of treatments: two or more different topical agents or an oral medication plus a topical medication  Another treatment used for acne may include corticosteroid injections, which are used to help relieve pain, decrease the size, and encourage the healing of large, inflamed acne nodules  Also, dermatologists sometimes perform acne surgery, using a fine needle, a pointed blade, or an instrument known as a comedone extractor to mechanically clean out clogged pores  One must always weigh the risk for inducing a scar with the potential benefits of any procedure   Prior treatment with topical retinoids can loosen whiteheads and blackheads and make it easier to physically remove such lesions  Heat-based devices, and light and laser therapy are being studied to see whether there is any role for such treatments in mild to moderate acne  At this time, there is not enough evidence to make general recommendations about their use  TOPICAL ACNE MEDICATIONS    WHAT KIND OF TOPICALS ARE THERE?  Benzoyl peroxide (BP) helps to fight inflammation and is anti-microbial (kills bacteria, viruses, and other microorganisms) and is believed to help prevent resistance of bacteria to topical antibiotics  A benzoyl peroxide wash may be recommended for use on large areas such as the chest and/or back  Mild irritation and dryness are common when first using benzoyl peroxide-containing products  Be careful because benzoyl peroxide can bleach towels and clothing!  Retinoids (such as adapalene, tretinoin, or tazarotene) unplug the oil glands by helping peel away the layers of skin and other things plugging the opening of the glands  Mild irritation and dryness are common when first using these products  Facial waxing and other skin procedures can lead to excessive irritation and should be avoided during retinoid therapy   Antibiotics fight bacteria and help decrease inflammation  Topical antibiotics commonly used in acne include clindamycin, erythromycin, and combination agents (such as clindamycin/benzoyl peroxide or erythromycin/benzoyl peroxide)  Mild irritation and dryness are common when first using these products  Typically, topical antibiotics should not be used alone as treatment for acne   Other topical agents include salicylic acid, azelaic acid, dapsone, and sulfacetamide  Mild irritation and dryness can also occur when first using these products  USING YOUR TOPICAL TREATMENTS LIKE A PRO   Apply topical medications only to clean, dry skin   Topical medications may lead to significant dryness of the affected areas  To minimize this, wait 15-20 minutes after washing before applying your topical medication   These medications work deep in the skin to prevent new breakouts  Spot treatment of individual pimples does not do much  When applying topical medications to the face, use the 5-dot method  Start by placing a small pea-sized amount of the medication on your finger  Then, place dots in each of five locations of your face: Mid-forehead, each cheek, nose, and chin  Next, rub the medication into the entire area of skin - not just on individual pimples! Try to avoid the delicate skin around your eyes and corners of your mouth   The medications are not magic! They take weeks if not months to work  Be patient and use your medicine on a daily basis or as directed for six weeks before asking if your skin looks better  Try not to miss more than one or two days each week when using your medications   If you are starting a new medication, then try using it every other night or even every third night   Gradually work up to [employer] a day    This will give your skin time to adjust    The same medications often come in various forms or formulations: Creams, ointments, lotions, gels, microspheres, or foams  Use the formulation that has been recommended and don't switch to other forms unless instructed  Some forms (such as alcohol based gels) may be more drying and less tolerable for certain skin types   Sometimes individual medications are not as effective as a combination of two or more agents  The doctor may need to try several medications or combinations before finding the one that is best for that patient   Moisturizer, sunscreen, and make-up may be used in conjunction with topical acne medications  In general, acne medications are applied first so they may directly contact the skin  Ask your physician to review specific application instructions!    It is especially important to always use sunscreen when using a topical retinoid or oral antibiotic  These drugs can make your skin more sensitive to the sun  In general, sunscreen gets applied AFTER any acne medications   Don't stop using your acne medications just because your acne got better  Remember, the acne is better because of the medication, and prevention is the nelson to treatment  ORAL ACNE MEDICATIONS    ORAL ANTIBIOTICS  Antibiotics include tetracycline-class medicines (which include the most commonly used oral antibiotics for acne, minocycline, and doxycycline), erythromycin, trimethoprim-sulfamethoxazole, and occasionally cephalexin or azithromycin  These drugs may decrease bacteria and inflammation, and they are most effective for moderate-to-severe inflammatory acne  A product containing benzoyl peroxide should be used along with these antibiotics to help decrease the possibility of microbial resistance  Always take your acne pills with lots of water! A pill stuck in your throat can cause significant burning and irritation  Drink a full glass of water to ensure the pill gets into your stomach  Avoid popping a pill right before bed, and stay upright for at least 1 hour after taking a pill  DOXYCYCLINE   This medication is usually taken ONCE or TWICE per day, as instructed by your physician  NOTE: Always take this medication with lots of water! A pill stuck in the throat can cause significant burning and irritation  Avoid popping a pill right before bed & stay upright for at least one hour after taking a pill  WARNING: Doxycycline increases your sensitivity to the sun, so practice excellent sun protection!  If you notice any of the following, stop using the medication and notify your health care provider: headaches; blurred vision; dizziness; sun sensitivity; heartburn-stomach pain; irritation of the esophagus; darkening of scars, gums, or teeth (more often with minocycline); nail changes; yellowing of the eyes or skin (indicating possible liver disease); joint pains-and flu-like symptoms  Taking oral antibiotics with food may help with symptoms of upset stomach  COMMON SIDE EFFECTS: Headaches; dizziness; sun sensitivity; irritation of the throat; discoloration of scars, gums, or teeth; nail changes  HAVING PROBLEMS WITH ANY OF YOUR TREATMENTS? You should not be able to see any of the medicines on your face  If you can see a white film on your skin after you apply the medication, there is too much medicine in that area and you need to apply a thinner coat and make sure it is spread evenly on your face  If your skin gets too dry, you can apply a light (non-comedogenic) moisturizer on top of your medicine or you may switch to using the medicine every other day instead of every day  If your skin is still too irritated, you may need to switch to a milder medication  If your skin is red and very itchy, you may be allergic to the medication and you should stop using it  COMMON POSSIBLE SIDE EFFECTS OF MEDICATIONS     Retinoids - dryness, redness, increased sun sensitivity   Benzoyl peroxide - drying, redness, bleaching of clothes, towels and sheets, allergy   Doxycycline - headaches; dizziness; irritation of the throat; nail changes; discoloration of teeth   Sun sensitivity - even if you have dark skin, this medicine can make you burn more easily  Make sure you protect yourself from the sun, either by avoiding being outside between 11 AM and 3 PM, wearing and reapplying sunscreen/sunblock, or wearing sun protective clothing   Nausea/vomiting - if you experience nausea with this medication, take it with food  WHEN AND WHERE TO CALL WITH CONCERNS  We are here to help! If you experience any unusual symptoms, then stop taking or using the medication and call our office at [phone] ([phone])  It is better to be safe than to be sorry!     FOLLICULITIS (possible mild Hidradenitis suppuritiva)    Physical Exam:   Anatomic Location Affected:  Buttock with few lessions in groin   Morphological Description:  Moderate scarring inflammatory papules up to 0 3 cm   Pertinent Positives:   Pertinent Negatives: Additional History of Present Condition:  Patient using clindamycin lotion    Assessment and Plan:  Based on a thorough discussion of this condition and the management approach to it (including a comprehensive discussion of the known risks, side effects and potential benefits of treatment), the patient (family) agrees to implement the following specific plan:   Continue to apply clindamycin lotion twice daily to affected areas   History and chronicity is reviewed  She notes that in past some of these lesions have been painful    We discussed merits of androgen suppression with spirolactone in hidradenitis management   Risks benefit of low dose doxycyline reviewed at 20 mg BID    What is folliculitis? Folliculitis is the name given to a group of skin conditions in which there are inflamed hair follicles  The result is a tender red spot, often with a surface pustule  Folliculitis may be superficial or deep  It can affect anywhere there are hairs, including chest, back, buttocks, arms and legs  Acne and its variants are also types of folliculitis  What causes folliculitis? Folliculitis can be due to infection, occlusion (blockage), irritation and various skin diseases  Folliculitis due to infection  To determine if folliculitis is due to an infection, swabs should be taken from the pustules for cytology and culture in the laboratory  Bacteria  Bacterial folliculitis is commonly due to Staphylococcus aureus  If the infection involves the deep part of the follicle, it results in a painful boil  Recommended treatment includes careful hygiene, antiseptic cleanser or cream, antibiotic ointment, and/or oral antibiotics      Spa pool folliculitis is due to infection with Pseudomonas aeruginosa, which thrives in inadequately chlorinated warm water  Gram negative folliculitis is a pustular facial eruption also due to infection with Pseudomonas aeruginosa or other similar organisms  When it appears, it usually follows tetracycline treatment of acne, but is quite rare  Yeasts  The most common yeast to cause a folliculitis is Pityrosporum ovale, also known as Malassezia  Malassezia folliculitis (Pityrosporum folliculitis) is an itchy acne-like condition usually affecting the upper trunk of a young adult  Treatment includes avoiding moisturisers, stopping any antibiotics and topical antifungal or oral antifungal medication for several weeks  Candida albicans can also provoke a folliculitis in skin folds (intertrigo) or in the beard area  It is treated with topical or oral antifungal agents  Fungi  Ringworm of the scalp (tinea capitis) usually results in scaling and hair loss, but sometimes results in folliculitis  In [place], cat ringworm (Microsporum canis) is the commonest organism causing scalp fungal infection  Other fungi such as Trichophyton tonsurans are increasingly reported  Treatment is with oral antifungal agents for several months  Viral infections  Folliculitis may caused by herpes simplex virus  This tends to be tender, and resolves without treatment in around 10 days  Severe recurrent attacks may be treated with acyclovir and other antiviral agents  Herpes zoster (the cause of shingles) may also present as folliculitis with painful pustules and crusted spots within a dermatome (an area of skin supplied by a single nerve)  It is treated with hihg-dose acyclovir  Molluscum contagiosum, common in young children, may also cause follicular umbilicated papules, usually clustered in and around a body fold  Molluscum may provoke dermatitis      Parasitic infection  Folliculitis on the face or scalp of older or immunosuppressed adults may be due to colonisation by hair follicle mites (demodex)  This is known as demodicosis  The human infestation, scabies, often provokes folliculitis, as well as non-follicular papules, vesicles and pustules  Folliculitis due to irritation from regrowing hairs  Folliculitis may arise as hairs regrow after shaving, waxing, electrolysis or plucking  Swabs taken from the pustules are sterile ie there is no growth of bacteria or other organisms  In the beard area irritant folliculitis is known as pseudofolliculitis barbae  Irritant folliculitis is also common on the lower legs of women (shaving rash)  It is frequently very itchy  Treatment is by stopping hair removal, and not beginning again for about three months after the folliculitis has settled  To prevent reoccurring irritant folliculitis, use a gentle hair removal method, such as a lady's electric razor  Avoid soap and apply plenty of shaving gel, if using a blade shaver  Folliculitis due to contact reactions  Occlusion  Paraffin-based ointments, moisturisers, and adhesive plasters may all result in a sterile folliculitis  If a moisturiser is needed, choose an oil-free product, as it is less likely to cause occlusion  Chemicals  Coal tar, cutting oils and other chemicals may cause an irritant folliculitis  Avoid contact with the causative product  Topical steroids  Overuse of topical steroids may produce a folliculitis  Perioral dermatitis is a facial folliculitis provoked by moisturisers and topical steroids  Perioral dermatitis is treated with tetracycline antibiotics for six weeks or so  Folliculitis due to immunosuppression  Eosinophilic folliculitis is a specific type of folliculitis that may arise in some immune suppressed individuals such as those infected by human immunodeficiency virus (HIV) or those who have cancer      Folliculitis due to drugs  Folliculitis may be due to drugs, particularly corticosteroids (steroid acne), androgens (male hormones), ACTH, lithium, isoniazid (INH), phenytoin and B-complex vitamins  Protein kinase inhibitors (epidermal growth factor receptor inhibitors) and targeted therapy for metastatic melanoma (vemurafenib, dabrafenib) nearly always result in folliculitis  Folliculitis due to inflammatory skin diseases  Certain uncommon inflammatory skin diseases may cause permanent hair loss and scarring because of deep seated sterile folliculitis  These include:   Lichen planus   Discoid lupus erythematosus   Folliculitis decalvans   Folliculitis keloidalis     Treatment depends on the underlying condition and its severity  A skin biopsy is often necessary to establish the diagnosis  Acne variants   Acne and acne-like or acneform disorders are also forms of folliculitis  These include:   Acne vulgaris   Nodulocystic acne   Rosacea   Scalp folliculitis   Chloracne    The follicular occlusion syndrome refers to:   Hidradenitis suppurativa (acne inversa)   Acne conglobata (a severe form of nodulocystic acne)   Dissecting cellulitis (perifolliculitis capitis abscedens et suffodiens)   Pilonidal sinus  Treatment of the acne variants may include topical therapy as well as long courses of tetracycline antibiotics, isotretinoin (vitamin-A derivative) and in women, antiandrogenic therapy  Buttock folliculitis  Folliculitis affecting the buttocks is quite common and is often nonspecific, ie no specific cause is found  Buttock folliculitis is equally common in males and females   Acute buttock folliculitis is usually bacterial in origin (like boils), resulting in red painful papules and pustules  It clears with antibiotics   Chronic buttock folliculitis does not often cause significant symptoms but it can be very persistent  Although antiseptics, topical acne treatments, peeling agents such as alphahydroxy acids, long courses of oral antibiotics and isotretinoin can help buttock folliculitis, they are not always effective   Hair removal might be worth trying if the affected area is hairy   As regrowth of hair can make it worse, permanent hair reduction by laser or intense pulsed light (IPL) is best   Scribe Attestation    I,:  Marylou Lombard am acting as a scribe while in the presence of the attending physician :       I,:  Dev Frias MD personally performed the services described in this documentation    as scribed in my presence :

## 2022-05-18 ENCOUNTER — TELEPHONE (OUTPATIENT)
Dept: DERMATOLOGY | Facility: CLINIC | Age: 47
End: 2022-05-18

## 2022-05-18 NOTE — TELEPHONE ENCOUNTER
I called patient and reinterated concerns re potential hidradenitis low grade  I reviewed future option of spironolactone

## 2022-06-09 DIAGNOSIS — L70.0 ACNE VULGARIS: ICD-10-CM

## 2022-06-09 RX ORDER — DOXYCYCLINE HYCLATE 20 MG
TABLET ORAL
Qty: 180 TABLET | Refills: 0 | Status: SHIPPED | OUTPATIENT
Start: 2022-06-09 | End: 2022-07-09

## 2022-07-26 ENCOUNTER — OFFICE VISIT (OUTPATIENT)
Dept: DERMATOLOGY | Facility: CLINIC | Age: 47
End: 2022-07-26
Payer: COMMERCIAL

## 2022-07-26 VITALS — BODY MASS INDEX: 27.74 KG/M2 | WEIGHT: 183 LBS | HEIGHT: 68 IN | TEMPERATURE: 98.3 F

## 2022-07-26 DIAGNOSIS — R23.8 INFLAMMATORY PAPULE: Primary | ICD-10-CM

## 2022-07-26 DIAGNOSIS — R76.8 POSITIVE ANA (ANTINUCLEAR ANTIBODY): ICD-10-CM

## 2022-07-26 DIAGNOSIS — L82.1 SEBORRHEIC KERATOSIS: ICD-10-CM

## 2022-07-26 DIAGNOSIS — L73.9 FOLLICULITIS: ICD-10-CM

## 2022-07-26 DIAGNOSIS — L70.0 ACNE VULGARIS: ICD-10-CM

## 2022-07-26 DIAGNOSIS — L81.1 MELASMA: ICD-10-CM

## 2022-07-26 PROCEDURE — 99214 OFFICE O/P EST MOD 30 MIN: CPT | Performed by: DERMATOLOGY

## 2022-07-26 RX ORDER — SPIRONOLACTONE 50 MG/1
TABLET, FILM COATED ORAL
Qty: 30 TABLET | Refills: 6 | Status: SHIPPED | OUTPATIENT
Start: 2022-07-26 | End: 2022-08-17

## 2022-07-26 NOTE — PROGRESS NOTES
[facility] Follow Up Note    Patient Name: Kenton Woods  Encounter Date: 7/26/2022    Today's Chief Concerns:  Concern #1:  Follow up acne  Concern #2:  Lesion on lip, chest and neck  Concern #3:  Darkness on chest    Current Medications:    Current Outpatient Medications:     amphetamine-dextroamphetamine (ADDERALL XR) 10 MG 24 hr capsule, dextroamphetamine-amphetamine ER 10 mg 24hr capsule,extend release, Disp: , Rfl:     Azelaic Acid (Finacea) 15 % FOAM, Apply topically once or twice a day to a face area , Disp: 50 g, Rfl: 5    clindamycin (CLEOCIN T) 1 % lotion, Apply topically 2 (two) times a day Apply topically to affected areas twice a day , Disp: 60 mL, Rfl: 5    clindamycin (CLEOCIN T) 1 %, Apply 1 pad topically in the morning and 1 pad in the evening , Disp: 30 pad, Rfl: 5    fluticasone (FLONASE) 50 mcg/act nasal spray, 1 spray into each nostril daily Using as needed, Disp: , Rfl:     Junel FE 1 5/30 1 5-30 MG-MCG tablet, ONE PILL DAILY, SKIP THE INACTIVE PILLS AND RESTART A NEW PACK IMMEDIATELY, Disp: , Rfl:     levothyroxine 50 mcg tablet, levothyroxine 50 mcg tablet, Disp: , Rfl:     Multiple Vitamin (MULTI-VITAMIN DAILY PO), Take by mouth, Disp: , Rfl:     spironolactone (ALDACTONE) 50 mg tablet, Take one tablet every morning, Disp: 30 tablet, Rfl: 6    CONSTITUTIONAL:   Vitals:    07/26/22 0932   Temp: 98 3 °F (36 8 °C)   TempSrc: Temporal   Weight: 83 kg (183 lb)   Height: 5' 8" (1 727 m)       Specific Alerts:    Have you been seen by a [facility] Dermatologist in the last 3 years? YES    Are you pregnant or planning to become pregnant? No    Are you currently or planning to be nursing or breast feeding? No    Allergies   Allergen Reactions    Benzoyl Peroxide Rash     Other reaction(s): itching, swelling  Other reaction(s): itching, swelling         May we call your Preferred Phone number to discuss your specific medical information?  YES    May we leave a detailed message that includes your specific medical information? YES    Have you traveled outside of the [facility] in the past 3 months? No    Do you currently have a pacemaker or defibrillator? No    Do you have any artificial heart valves, joints, plates, screws, rods, stents, pins, etc? No   - If Yes, were any placed within the last 2 years? Do you require any medications prior to a surgical procedure? No   - If Yes, for which procedure? - If Yes, what medications to you require? Are you taking any medications that cause you to bleed more easily ("blood thinners") No    Have you ever experienced a rapid heartbeat with epinephrine? No    Have you ever been treated with "gold" (gold sodium thiomalate) therapy? No    [facility] can help with wrinkles, "laugh lines," facial volume loss, "double chin," "love handles," age spots, and more  Are you interested in learning today about some of the skin enhancement procedures that we offer? (If Yes, please provide more detail) No    Review of Systems:  Have you recently had or currently have any of the following?     Fever or chills: No  Night Sweats: YES, thryoid issues  Headaches: No  Weight Gain: YES  Weight Loss: No  Blurry Vision: No  Nausea: No  Vomiting: No  Diarrhea: No  Blood in Stool: No  Abdominal Pain: No  Itchy Skin: No  Painful Joints: YES  Swollen Joints: YES  Muscle Pain: No  Irregular Mole: No  Sun Burn: No  Dry Skin: No  Skin Color Changes: No  Scar or Keloid: No  Cold Sores/Fever Blisters: No  Bacterial Infections/MRSA: No  Anxiety: No  Depression: No  Suicidal or Homicidal Thoughts: No      PSYCH: Normal mood and affect  EYES: Normal conjunctiva  ENT: Normal lips and oral mucosa  CARDIOVASCULAR: No edema  RESPIRATORY: Normal respirations  HEME/LYMPH/IMMUNO:  No regional lymphadenopathy except as noted below in ASSESSMENT AND PLAN BY DIAGNOSIS     FULL ORGAN SYSTEM SKIN EXAM (SKIN)  Hair, Scalp, Ears, Face Normal except as noted below in Assessment   Neck, Cervical Chain Nodes Normal except as noted below in Assessment   Right Arm/Hand/Fingers Normal except as noted below in Assessment   Left Arm/Hand/Fingers Normal except as noted below in Assessment   Chest/Breasts/Axillae Viewed areas Normal except as noted below in Assessment   Abdomen, Umbilicus Normal except as noted below in Assessment   Back/Spine Normal except as noted below in Assessment   Groin/Genitalia/Buttocks Viewed areas Normal except as noted below in Assessment   Right Leg, Foot, Toes Normal except as noted below in Assessment   Left Leg, Foot, Toes Normal except as noted below in Assessment       1  INFLAMMATORY PAPULE (MILIA_LIKE)    Physical Exam:  Anatomic Location Affected:  Right upper lateral lip  Morphological Description:  Erythematous papule    Additional History of Present Condition:  Present for 1 week    Assessment and Plan:  Based on a thorough discussion of this condition and the management approach to it (including a comprehensive discussion of the known risks, side effects and potential benefits of treatment), the patient (family) agrees to implement the following specific plan:  Observe for resolution    2  SEBORRHEIC KERATOSIS; NON-INFLAMED    Physical Exam:  Anatomic Location Affected:  Left chest  Morphological Description:  Flat and raised, waxy, smooth to warty textured, yellow to brownish-grey to dark brown to blackish, discrete, "stuck-on" appearing papules  Additional History of Present Condition:  Patient reports new bumps on the skin  Denies itch, burn, pain, bleeding or ulceration  Present constantly; nothing seems to make it worse or better  No prior treatment        Assessment and Plan:  Based on a thorough discussion of this condition and the management approach to it (including a comprehensive discussion of the known risks, side effects and potential benefits of treatment), the patient (family) agrees to implement the following specific plan:  Reassure benign    Seborrheic Keratosis  A seborrheic keratosis is a harmless warty spot that appears during adult life as a common sign of skin aging  Seborrheic keratoses can arise on any area of skin, covered or uncovered, with the usual exception of the palms and soles  They do not arise from mucous membranes  Seborrheic keratoses can have highly variable appearance  Seborrheic keratoses are extremely common  It has been estimated that over 90% of adults over the age of 61 years have one or more of them  They occur in males and females of all races, typically beginning to erupt in the 35s or 45s  They are uncommon under the age of 21 years  The precise cause of seborrhoeic keratoses is not known  Seborrhoeic keratoses are considered degenerative in nature  As time goes by, seborrheic keratoses tend to become more numerous  Some people inherit a tendency to develop a very large number of them; some people may have hundreds of them  The name "seborrheic keratosis" is misleading, because these lesions are not limited to a seborrhoeic distribution (scalp, mid-face, chest, upper back), nor are they formed from sebaceous glands, nor are they associated with sebum -- which is greasy  Seborrheic keratosis may also be called "SK," "Seb K," "basal cell papilloma," "senile wart," or "barnacle "      Researchers have noted:  Eruptive seborrhoeic keratoses can follow sunburn or dermatitis  Skin friction may be the reason they appear in body folds  Viral cause (e g , human papillomavirus) seems unlikely  Stable and clonal mutations or activation of FRFR3, PIK3CA, DEANDRE, AKT1 and EGFR genes are found in seborrhoeic keratoses  Seborrhoeic keratosis can arise from solar lentigo  FRFR3 mutations also arise in solar lentigines   These mutations are associated with increased age and location on the head and neck, suggesting a role of ultraviolet radiation in these lesions  Seborrheic keratoses do not harbour tumour suppressor gene mutations  Epidermal growth factor receptor inhibitors, which are used to treat some cancers, often result in an increase in verrucal (warty) keratoses  There is no easy way to remove multiple lesions on a single occasion  Unless a specific lesion is "inflamed" and is causing pain or stinging/burning or is bleeding, most insurance companies do not authorize treatment  3  MELASMA    Physical Exam:  Anatomic Location Affected:  Chest and right lateral jawline  Morphological Description:  Hyperpigmented patch    Additional History of Present Condition:  Present for since this summer    Assessment and Plan:  Based on a thorough discussion of this condition and the management approach to it (including a comprehensive discussion of the known risks, side effects and potential benefits of treatment), the patient (family) agrees to implement the following specific plan:  Recommend using finacea foam once or twice a day  Can also try Differin   Neutrogena Daily Defense SPF 50+ at least three times a day  I noted potential associations with estrogen replacement    What is melasma? Melasma is a chronic skin disorder that results in symmetrical, blotchy, brownish facial pigmentation  It can lead to considerable embarrassment and distress  This form of facial pigmentation is sometimes called chloasma, but as this means green skin, the term melasma (brown skin) is preferred  Who gets melasma? Melasma is more common in women than in men; only 1-in-4 to 1-in-20 affected individuals are male, depending on the population studied  It generally starts between the age of 21 and 36 years, but it can begin in childhood or not until middle age  Melasma is more common in people that tan well or have naturally brown skin (Givens skin types 3 and 4) compared with those who have fair skin (skin types 1 and 2) or black skin (skin types 5 or 6)  What causes melasma?   The cause of melasma is complex  The pigmentation is due to overproduction of melanin by the pigment cells, melanocytes, which is taken up by the keratinocytes (epidermal melanosis) and/or deposited in the dermis (dermal melanosis, melanophages)  There is a genetic predisposition to melasma, with at least one-third of patients reporting other family members to be affected  In most people melasma is a chronic disorder  Known triggers for melasma include:  Sun exposure and sun damage--this is the most important avoidable risk factor   Pregnancy--in affected women, the pigment often fades a few months after delivery   Hormone treatments--oral contraceptive pills containing oestrogen and/or progesterone, hormone replacement, intrauterine devices and implants are a factor in about a quarter of affected women   Certain medications (including new targeted therapies for cancer), scented or deodorant soaps, toiletries and cosmetics--these may cause a phototoxic reaction that triggers melasma, which may then persist long term   Hypothyroidism (low levels of circulating thyroid hormone)    Melasma commonly arises in healthy, non-pregnant adults  Lifelong sun exposure causes deposition of pigment within the dermis and this often persists longterm  Exposure to ultraviolet radiation (UVR) deepens the pigmentation because it activates the melanocytes to produce more melanin  Research is attempting to pinpoint the roles of stem cell, neural, vascular and local hormonal factors in promoting melanocyte activation  What are the clinical features of melasma? Melasma presents as macules (freckle-like spots) and larger flat brown patches  These are found on both sides of the face and have an irregular border  There are several distinct patterns    Centrofacial pattern: forehead, cheeks, nose and upper lips   Malar pattern: cheeks and nose   Lateral cheek pattern   Mandibular pattern: jawline   Reddened or inflamed forms of melasma (also called erythrosis pigmentosa faciei)   Poikiloderma of [place]: reddened, photoaging changes seen on the sides of the neck, mostly affecting patients older than 50 years   Brachial type of melasma affecting shoulders and upper arms (also called acquired brachial cutaneous dyschromatosis)  Melasma is sometimes  into epidermal (skin surface), dermal (deeper) and mixed types  A Wood lamp that emits black light (UVA1) may be used to identify the depth of the pigment  Some general classifications include the following:    Epidermal melasma  Well-defined border   Dark brown colour   Appears more obvious under black light   Responds well to treatment    Dermal melasma  Ill-defined border   Light brown or bluish in colour   Unchanged under black light   Responds poorly to treatment    Mixed melasma  The most common type   Combination of bluish, light and dark brown patches   Mixed pattern seen under black light   Partial improvement with treatment    What is the treatment of melasma? Melasma can be very slow to respond to treatment, especially if it has been present for a long time  Treatment may result in irritant contact dermatitis in patients with sensitive skin, and this can result in post-inflammatory pigmentation  Generally a combination of the following measures is helpful  General measures  Discontinue hormonal contraception  Year-round life-long sun protection  Wear a broad-brimmed hat  Use broad-spectrum very high protection factor (SPF 50+) sunscreen applied to the whole face daily, year-round  It should be reapplied every 2 hours if outdoors during the summer months  Sunscreens containing iron oxides are preferred, as they screen out some visible light as well as ultraviolet radiation  Alternatively or as well, use a make-up that contains sunscreen  Use a mild cleanser, and if the skin is dry, a light moisturiser  Cosmetic camouflage (make-up) is invaluable to disguise the pigment      Topical therapy  Tyrosinase inhibitors are the mainstay of treatment  The aim is to prevent new pigment formation by inhibiting formation of melanin by the melanocytes  Hydroquinone 2-4% as cream or lotion, applied accurately to pigmented areas at night for 2-4 months  This may cause contact dermatitis (stinging and redness) in 25% of patients  It should not be used in higher concentration or for prolonged courses as it has been associated with ochronosis (a bluish grey discolouration similar to that seen in alkaptonuria)  Azelaic acid cream, lotion or gel can be applied twice daily long term, and is safe in pregnancy  This may also sting  Kojic acid or kojic acid dipalmitate is often included in formulations, as it binds copper, required by L-DOPA (a cofactor of tyrosinase)  Kojic acid can cause irritant contact dermatitis and less commonly, allergic contact dermatitis  The mechanism of action of cysteamine cream is unclear, but is thought to involve inhibition of tyrosinase  A study of 50 patients with melasma found cysteamine cream to be significantly more effective than placebo cream    Ascorbic acid (vitamin C) also acts through copper to inhibit pigment production  It is well tolerated but highly unstable, so is usually combined with other agents  Methimazole (antithyroid drug) cream has been reported to reduce melanin synthesis and pigmentation in hydroquinone-resistant melasma  New agents under investigation include zinc sulfate mequinol, arbutin and deoxyarbutin (from berries), licorice extract, rucinol, resveratrol, 4-hydroxy-anisole, 2,5-dimethyl-4-hydroxy-3(2H)-furanone and/or N-acetyl glucosamine    Other active compounds used for melasma include:  Topical corticosteroids such as hydrocortisone  These work quickly to fade the colour and reduce the likelihood of contact dermatitis caused by other agents  Potent topical steroids are best avoided due to their potential to cause adverse effects  Soybean extract, which is thought to reduce the transfer of pigment from melanocytes to skin cells (keratinocytes) and to inhibit receptors  Tranexamic acid has been used experimentally for melasma as a cream or injected into the skin (mesotherapy), showing some benefit  It may cause allergy or irritation  Superficial or epidermal pigment can be peeled off  Peeling can also allow tyrosinase inhibitors to penetrate more effectively  These must be done carefully as peels may also induce post-inflammatory pigmentation  Topical alpha hydroxyacids including glycolic acid and lactic acid, as creams or as repeated superficial chemical peels, remove the surface skin and their low pH inhibits the activity of tyrosinase  Topical retinoids, such as tretinoin (a prescription medicine) are effective  Tretinoin can be hard to tolerate and sometimes causes contact dermatitis  Do not use during pregnancy  Salicylic acid, a common peeling ingredient in skin creams, can also be used for chemical peels, but it is not very effective in melasma  The most successful formulation has been a combination of hydroquinone, tretinoin, and moderate potency topical steroid  This has been found to result in improvement or clearance in up to 60-80% of those treated  Many other combinations of topical agents are in common use, as they are more effective than any one alone  However, these products are often expensive  Oral treatment of melasma  Oral medications for melasma are under investigation, including tranexamic acid  Tranexamic acid is a lysine analogue that inhibits plasmin and is usually used orally to stop bleeding  It reduces production of prostaglandins, the precursors of tyrosine  In low dose, tranexamic acid has been reported to be effective and safe in the treatment of melasma, providing patients have been carefully selected and are at low risk of thromboembolic disease    Glutathione is also under investigation as a systemic skin whitening agent, but has potentially serious adverse effects  Devices used to treat melasma  The ideal treatment for melasma would destroy the pigment, while leaving the cells alone  Unfortunately, this is hard to achieve  Machines can be used to remove epidermal pigmentation but with caution--over-treatment may cause postinflammatory pigmentation  Patients should be pretreated with a tyrosinase inhibitor (see above)  Fractional lasers, Q-switched Nd:YAG lasers and intense pulsed light (IPL) appear to be the most suitable options  Several treatments may be necessary and post-inflammatory hyperpigmentation may complicate recovery  Carbon dioxide or erbium:YAG resurfacing lasers, pigment lasers (Q-switched lui and Alexandrite devices) and mechanical dermabrasion and microdermabrasion should be used with caution in the treatment of melasma  What is the outcome of treatment of melasma? Results take time and the above measures are rarely completely successful  Unfortunately, even in those that get a good result from treatment, pigmentation may reappear on exposure to summer sun and/or because of hormonal factors  New topical and oral agents are being studied and offer hope for effective treatments in the future  4 ACNE VULGARIS ("COMMON ACNE")     FOLLICULITIS with early hidradenitis overlap    Physical Exam:  Anatomic Location Affected: face, buttock  Morphological Description: Open/Closed Comedones, inflammatory papules, pustules, and nodules     Additional History of Present Condition:  Present for years    Assessment and Plan: We reviewed the causes of acne, the kinds of acne, and the expected clinical course  We discussed treatment options ranging from over-the-counter products, topical retinoids, antibiotics, BP, hormonal therapies (OCPs/spironolactone), and isotretinoin (Accutane)  We reviewed specific over-the-counter interventions and medications   Recommended typical hygiene measures washing regularly with mild cleanser, and refraining from picking and popping any pimples  Recommended non-comedogenic sunscreen use daily  Expectations of therapy discussed  Side effects, risks and benefits of medications discussed  A comprehensive handout with treatment plan provided  The phone number to call in case of questions or concerns (and instructions to stop medications in such a scenario) was provided  After lengthy discussion of etiology and treatment options, we decided to implement the following personalized treatment plan:      Spironolactone (prescription medicine) - take 1 pill daily  Spironolactone; discussed risks, benefits, side effects including breast tenderness, breast enlargement, spotting, diuresis, dizziness   -Limit bananas and avocados to one daily  Avoid coconut water while on this medicine       Make sure all products you use on face are labeled as "non-comedongenic" or "oil-free" or " does not clog pores"  Acne can be frustrating and difficult to treat  Most acne regimens take 2-3 months to see an improvement, so stick with them  Don't give up! As always, call your doctor if you have any concerns about your medications  Labs ordered to be done in 1 month  I reviewed adverse effects   Will stop oral antibiotic  Will recheck TAHIR and subsets to assess progression in one month  Will be off antibiotic for one month prior to repeat TAHIR    Scribe Attestation    I,:  Katey Aguilar MA am acting as a scribe while in the presence of the attending physician :       I,:  Samir Spaulding MD personally performed the services described in this documentation    as scribed in my presence :

## 2022-07-26 NOTE — PATIENT INSTRUCTIONS
1  INFLAMMATORY PAPULE (MILIA_LIKE)    Physical Exam:  Anatomic Location Affected:  Right upper lateral lip  Assessment and Plan:  Based on a thorough discussion of this condition and the management approach to it (including a comprehensive discussion of the known risks, side effects and potential benefits of treatment), the patient (family) agrees to implement the following specific plan:  Observe for resolution    2  SEBORRHEIC KERATOSIS; NON-INFLAMED    Physical Exam:  Anatomic Location Affected:  Left chest      Assessment and Plan:  Based on a thorough discussion of this condition and the management approach to it (including a comprehensive discussion of the known risks, side effects and potential benefits of treatment), the patient (family) agrees to implement the following specific plan:  Reassure benign    Seborrheic Keratosis  A seborrheic keratosis is a harmless warty spot that appears during adult life as a common sign of skin aging  Seborrheic keratoses can arise on any area of skin, covered or uncovered, with the usual exception of the palms and soles  They do not arise from mucous membranes  Seborrheic keratoses can have highly variable appearance  Seborrheic keratoses are extremely common  It has been estimated that over 90% of adults over the age of 61 years have one or more of them  They occur in males and females of all races, typically beginning to erupt in the 35s or 45s  They are uncommon under the age of 21 years  The precise cause of seborrhoeic keratoses is not known  Seborrhoeic keratoses are considered degenerative in nature  As time goes by, seborrheic keratoses tend to become more numerous  Some people inherit a tendency to develop a very large number of them; some people may have hundreds of them      The name "seborrheic keratosis" is misleading, because these lesions are not limited to a seborrhoeic distribution (scalp, mid-face, chest, upper back), nor are they formed from sebaceous glands, nor are they associated with sebum -- which is greasy  Seborrheic keratosis may also be called "SK," "Seb K," "basal cell papilloma," "senile wart," or "barnacle "      Researchers have noted:  Eruptive seborrhoeic keratoses can follow sunburn or dermatitis  Skin friction may be the reason they appear in body folds  Viral cause (e g , human papillomavirus) seems unlikely  Stable and clonal mutations or activation of FRFR3, PIK3CA, DEANDRE, AKT1 and EGFR genes are found in seborrhoeic keratoses  Seborrhoeic keratosis can arise from solar lentigo  FRFR3 mutations also arise in solar lentigines  These mutations are associated with increased age and location on the head and neck, suggesting a role of ultraviolet radiation in these lesions  Seborrheic keratoses do not harbour tumour suppressor gene mutations  Epidermal growth factor receptor inhibitors, which are used to treat some cancers, often result in an increase in verrucal (warty) keratoses  There is no easy way to remove multiple lesions on a single occasion  Unless a specific lesion is "inflamed" and is causing pain or stinging/burning or is bleeding, most insurance companies do not authorize treatment  3  MELASMA    Physical Exam:  Anatomic Location Affected:  Chest and right lateral jawline  Morphological Description:  Hyperpigmented patc    Assessment and Plan:  Based on a thorough discussion of this condition and the management approach to it (including a comprehensive discussion of the known risks, side effects and potential benefits of treatment), the patient (family) agrees to implement the following specific plan:  Recommend using finacea foam once or twice a day  Can also try Differin   Neutrogena Daily Defense SPF 50+ at least three times a day    What is melasma? Melasma is a chronic skin disorder that results in symmetrical, blotchy, brownish facial pigmentation  It can lead to considerable embarrassment and distress    This form of facial pigmentation is sometimes called chloasma, but as this means green skin, the term melasma (brown skin) is preferred  Who gets melasma? Melasma is more common in women than in men; only 1-in-4 to 1-in-20 affected individuals are male, depending on the population studied  It generally starts between the age of 21 and 36 years, but it can begin in childhood or not until middle age  Melasma is more common in people that tan well or have naturally brown skin (Givens skin types 3 and 4) compared with those who have fair skin (skin types 1 and 2) or black skin (skin types 5 or 6)  What causes melasma? The cause of melasma is complex  The pigmentation is due to overproduction of melanin by the pigment cells, melanocytes, which is taken up by the keratinocytes (epidermal melanosis) and/or deposited in the dermis (dermal melanosis, melanophages)  There is a genetic predisposition to melasma, with at least one-third of patients reporting other family members to be affected  In most people melasma is a chronic disorder  Known triggers for melasma include:  Sun exposure and sun damage--this is the most important avoidable risk factor   Pregnancy--in affected women, the pigment often fades a few months after delivery   Hormone treatments--oral contraceptive pills containing oestrogen and/or progesterone, hormone replacement, intrauterine devices and implants are a factor in about a quarter of affected women   Certain medications (including new targeted therapies for cancer), scented or deodorant soaps, toiletries and cosmetics--these may cause a phototoxic reaction that triggers melasma, which may then persist long term   Hypothyroidism (low levels of circulating thyroid hormone)    Melasma commonly arises in healthy, non-pregnant adults  Lifelong sun exposure causes deposition of pigment within the dermis and this often persists longterm   Exposure to ultraviolet radiation (UVR) deepens the pigmentation because it activates the melanocytes to produce more melanin  Research is attempting to pinpoint the roles of stem cell, neural, vascular and local hormonal factors in promoting melanocyte activation  What are the clinical features of melasma? Melasma presents as macules (freckle-like spots) and larger flat brown patches  These are found on both sides of the face and have an irregular border  There are several distinct patterns  Centrofacial pattern: forehead, cheeks, nose and upper lips   Malar pattern: cheeks and nose   Lateral cheek pattern   Mandibular pattern: jawline   Reddened or inflamed forms of melasma (also called erythrosis pigmentosa faciei)   Poikiloderma of [place]: reddened, photoaging changes seen on the sides of the neck, mostly affecting patients older than 50 years   Brachial type of melasma affecting shoulders and upper arms (also called acquired brachial cutaneous dyschromatosis)  Melasma is sometimes  into epidermal (skin surface), dermal (deeper) and mixed types  A Wood lamp that emits black light (UVA1) may be used to identify the depth of the pigment  Some general classifications include the following:    Epidermal melasma  Well-defined border   Dark brown colour   Appears more obvious under black light   Responds well to treatment    Dermal melasma  Ill-defined border   Light brown or bluish in colour   Unchanged under black light   Responds poorly to treatment    Mixed melasma  The most common type   Combination of bluish, light and dark brown patches   Mixed pattern seen under black light   Partial improvement with treatment    What is the treatment of melasma? Melasma can be very slow to respond to treatment, especially if it has been present for a long time  Treatment may result in irritant contact dermatitis in patients with sensitive skin, and this can result in post-inflammatory pigmentation    Generally a combination of the following measures is helpful  General measures  Discontinue hormonal contraception  Year-round life-long sun protection  Wear a broad-brimmed hat  Use broad-spectrum very high protection factor (SPF 50+) sunscreen applied to the whole face daily, year-round  It should be reapplied every 2 hours if outdoors during the summer months  Sunscreens containing iron oxides are preferred, as they screen out some visible light as well as ultraviolet radiation  Alternatively or as well, use a make-up that contains sunscreen  Use a mild cleanser, and if the skin is dry, a light moisturiser  Cosmetic camouflage (make-up) is invaluable to disguise the pigment  Topical therapy  Tyrosinase inhibitors are the mainstay of treatment  The aim is to prevent new pigment formation by inhibiting formation of melanin by the melanocytes  Hydroquinone 2-4% as cream or lotion, applied accurately to pigmented areas at night for 2-4 months  This may cause contact dermatitis (stinging and redness) in 25% of patients  It should not be used in higher concentration or for prolonged courses as it has been associated with ochronosis (a bluish grey discolouration similar to that seen in alkaptonuria)  Azelaic acid cream, lotion or gel can be applied twice daily long term, and is safe in pregnancy  This may also sting  Kojic acid or kojic acid dipalmitate is often included in formulations, as it binds copper, required by L-DOPA (a cofactor of tyrosinase)  Kojic acid can cause irritant contact dermatitis and less commonly, allergic contact dermatitis  The mechanism of action of cysteamine cream is unclear, but is thought to involve inhibition of tyrosinase  A study of 50 patients with melasma found cysteamine cream to be significantly more effective than placebo cream    Ascorbic acid (vitamin C) also acts through copper to inhibit pigment production  It is well tolerated but highly unstable, so is usually combined with other agents     Methimazole (antithyroid drug) cream has been reported to reduce melanin synthesis and pigmentation in hydroquinone-resistant melasma  New agents under investigation include zinc sulfate mequinol, arbutin and deoxyarbutin (from berries), licorice extract, rucinol, resveratrol, 4-hydroxy-anisole, 2,5-dimethyl-4-hydroxy-3(2H)-furanone and/or N-acetyl glucosamine    Other active compounds used for melasma include:  Topical corticosteroids such as hydrocortisone  These work quickly to fade the colour and reduce the likelihood of contact dermatitis caused by other agents  Potent topical steroids are best avoided due to their potential to cause adverse effects  Soybean extract, which is thought to reduce the transfer of pigment from melanocytes to skin cells (keratinocytes) and to inhibit receptors  Tranexamic acid has been used experimentally for melasma as a cream or injected into the skin (mesotherapy), showing some benefit  It may cause allergy or irritation  Superficial or epidermal pigment can be peeled off  Peeling can also allow tyrosinase inhibitors to penetrate more effectively  These must be done carefully as peels may also induce post-inflammatory pigmentation  Topical alpha hydroxyacids including glycolic acid and lactic acid, as creams or as repeated superficial chemical peels, remove the surface skin and their low pH inhibits the activity of tyrosinase  Topical retinoids, such as tretinoin (a prescription medicine) are effective  Tretinoin can be hard to tolerate and sometimes causes contact dermatitis  Do not use during pregnancy  Salicylic acid, a common peeling ingredient in skin creams, can also be used for chemical peels, but it is not very effective in melasma  The most successful formulation has been a combination of hydroquinone, tretinoin, and moderate potency topical steroid  This has been found to result in improvement or clearance in up to 60-80% of those treated   Many other combinations of topical agents are in common use, as they are more effective than any one alone  However, these products are often expensive  Oral treatment of melasma  Oral medications for melasma are under investigation, including tranexamic acid  Tranexamic acid is a lysine analogue that inhibits plasmin and is usually used orally to stop bleeding  It reduces production of prostaglandins, the precursors of tyrosine  In low dose, tranexamic acid has been reported to be effective and safe in the treatment of melasma, providing patients have been carefully selected and are at low risk of thromboembolic disease  Glutathione is also under investigation as a systemic skin whitening agent, but has potentially serious adverse effects  Devices used to treat melasma  The ideal treatment for melasma would destroy the pigment, while leaving the cells alone  Unfortunately, this is hard to achieve  Machines can be used to remove epidermal pigmentation but with caution--over-treatment may cause postinflammatory pigmentation  Patients should be pretreated with a tyrosinase inhibitor (see above)  Fractional lasers, Q-switched Nd:YAG lasers and intense pulsed light (IPL) appear to be the most suitable options  Several treatments may be necessary and post-inflammatory hyperpigmentation may complicate recovery  Carbon dioxide or erbium:YAG resurfacing lasers, pigment lasers (Q-switched lui and Alexandrite devices) and mechanical dermabrasion and microdermabrasion should be used with caution in the treatment of melasma  What is the outcome of treatment of melasma? Results take time and the above measures are rarely completely successful  Unfortunately, even in those that get a good result from treatment, pigmentation may reappear on exposure to summer sun and/or because of hormonal factors  New topical and oral agents are being studied and offer hope for effective treatments in the future      4 ACNE VULGARIS ("COMMON ACNE") FOLLICULITIS    Physical Exam:  Anatomic Location Affected: face, buttock    Assessment and Plan: We reviewed the causes of acne, the kinds of acne, and the expected clinical course  We discussed treatment options ranging from over-the-counter products, topical retinoids, antibiotics, BP, hormonal therapies (OCPs/spironolactone), and isotretinoin (Accutane)  We reviewed specific over-the-counter interventions and medications  Recommended typical hygiene measures washing regularly with mild cleanser, and refraining from picking and popping any pimples  Recommended non-comedogenic sunscreen use daily  Expectations of therapy discussed  Side effects, risks and benefits of medications discussed  A comprehensive handout with treatment plan provided  The phone number to call in case of questions or concerns (and instructions to stop medications in such a scenario) was provided  After lengthy discussion of etiology and treatment options, we decided to implement the following personalized treatment plan:      Spironolactone (prescription medicine) - take 1 pill daily  Spironolactone; discussed risks, benefits, side effects including breast tenderness, breast enlargement, spotting, diuresis, dizziness   -Limit bananas and avocados to one daily  Avoid coconut water while on this medicine       Make sure all products you use on face are labeled as "non-comedongenic" or "oil-free" or " does not clog pores"  Acne can be frustrating and difficult to treat  Most acne regimens take 2-3 months to see an improvement, so stick with them  Don't give up! As always, call your doctor if you have any concerns about your medications      Labs ordered to be done in 1 month

## 2022-08-17 DIAGNOSIS — L70.0 ACNE VULGARIS: ICD-10-CM

## 2022-08-17 DIAGNOSIS — L73.9 FOLLICULITIS: ICD-10-CM

## 2022-08-17 RX ORDER — SPIRONOLACTONE 50 MG/1
TABLET, FILM COATED ORAL
Qty: 90 TABLET | Refills: 3 | Status: SHIPPED | OUTPATIENT
Start: 2022-08-17 | End: 2022-10-04 | Stop reason: SDUPTHER

## 2022-10-04 ENCOUNTER — OFFICE VISIT (OUTPATIENT)
Dept: DERMATOLOGY | Facility: CLINIC | Age: 47
End: 2022-10-04
Payer: COMMERCIAL

## 2022-10-04 VITALS — TEMPERATURE: 98.3 F | HEIGHT: 68 IN | BODY MASS INDEX: 27.93 KG/M2 | WEIGHT: 184.3 LBS

## 2022-10-04 DIAGNOSIS — L81.1 MELASMA: ICD-10-CM

## 2022-10-04 DIAGNOSIS — L70.0 ACNE VULGARIS: Primary | ICD-10-CM

## 2022-10-04 DIAGNOSIS — L73.9 FOLLICULITIS: ICD-10-CM

## 2022-10-04 DIAGNOSIS — L71.9 ROSACEA: ICD-10-CM

## 2022-10-04 PROCEDURE — 99214 OFFICE O/P EST MOD 30 MIN: CPT | Performed by: DERMATOLOGY

## 2022-10-04 RX ORDER — AZELAIC ACID 0.15 G/G
AEROSOL, FOAM TOPICAL
Qty: 50 G | Refills: 5 | Status: SHIPPED | OUTPATIENT
Start: 2022-10-04

## 2022-10-04 RX ORDER — CLINDAMYCIN PHOSPHATE 10 UG/ML
LOTION TOPICAL 2 TIMES DAILY
Qty: 60 ML | Refills: 5 | Status: SHIPPED | OUTPATIENT
Start: 2022-10-04

## 2022-10-04 RX ORDER — SPIRONOLACTONE 50 MG/1
TABLET, FILM COATED ORAL
Qty: 90 TABLET | Refills: 3 | Status: SHIPPED | OUTPATIENT
Start: 2022-10-04

## 2022-10-04 RX ORDER — CLINDAMYCIN PHOSPHATE 10 MG/ML
1 SOLUTION TOPICAL 2 TIMES DAILY
Qty: 30 PAD | Refills: 5 | Status: SHIPPED | OUTPATIENT
Start: 2022-10-04

## 2022-10-04 NOTE — PATIENT INSTRUCTIONS
1  ACNE VULGARIS ("COMMON ACNE")  2  ROSACEA     Assessment and Plan: We reviewed the causes of acne, the kinds of acne, and the expected clinical course  We discussed treatment options ranging from over-the-counter products, topical retinoids, antibiotics, BP, hormonal therapies (OCPs/spironolactone), and isotretinoin (Accutane)  We reviewed specific over-the-counter interventions and medications  Recommended typical hygiene measures washing regularly with mild cleanser, and refraining from picking and popping any pimples  Recommended non-comedogenic sunscreen use daily  Expectations of therapy discussed  Side effects, risks and benefits of medications discussed  A comprehensive handout with treatment plan provided  The phone number to call in case of questions or concerns (and instructions to stop medications in such a scenario) was provided  After lengthy discussion of etiology and treatment options, we decided to implement the following personalized treatment plan:      Mornin  Apply Clindamycin lotion to entire affected area twice daily   2  Clindamycin Pads as needed   3  Please continue your Azelaic Acid 15% foam once or twice daily  Can continue to use with over the counter as well    4  Follow with an oil-free sunscreen (SPF 30 or higher)  Some options include Neutrogena oil-free moisturizer with SPF     Night:    1  Wash your face with a gentle face wash - like Cetaphil wash, Cerave Hydrating , LaRoche Hydrating Cleanser       Spironolactone (prescription medicine) keep at same dose for now 50 mg - take 1 pill daily  -Start spironolactone; discussed risks, benefits, side effects including breast tenderness, breast enlargement, spotting, diuresis, dizziness   -Limit bananas and avocados to one daily  Avoid coconut water while on this medicine         Make sure all products you use on face are labeled as "non-comedongenic" or "oil-free" or " does not clog pores"      Acne can be frustrating and difficult to treat  Most acne regimens take 2-3 months to see an improvement, so stick with them  Don't give up! As always, call your doctor if you have any concerns about your medications  3  MELASMA     Assessment and Plan:  Based on a thorough discussion of this condition and the management approach to it (including a comprehensive discussion of the known risks, side effects and potential benefits of treatment), the patient (family) agrees to implement the following specific plan:  Recommend using Azelaic Acid 15 foam   Can also try Differin   Neutrogena Daily Defense SPF 50+ at least three times a day     What is melasma? Melasma is a chronic skin disorder that results in symmetrical, blotchy, brownish facial pigmentation  It can lead to considerable embarrassment and distress  This form of facial pigmentation is sometimes called chloasma, but as this means green skin, the term melasma (brown skin) is preferred  Who gets melasma? Melasma is more common in women than in men; only 1-in-4 to 1-in-20 affected individuals are male, depending on the population studied  It generally starts between the age of 21 and 36 years, but it can begin in childhood or not until middle age  Melasma is more common in people that tan well or have naturally brown skin (Givens skin types 3 and 4) compared with those who have fair skin (skin types 1 and 2) or black skin (skin types 5 or 6)  What causes melasma? The cause of melasma is complex  The pigmentation is due to overproduction of melanin by the pigment cells, melanocytes, which is taken up by the keratinocytes (epidermal melanosis) and/or deposited in the dermis (dermal melanosis, melanophages)  There is a genetic predisposition to melasma, with at least one-third of patients reporting other family members to be affected  In most people melasma is a chronic disorder    Known triggers for melasma include:  Sun exposure and sun damage--this is the most important avoidable risk factor   Pregnancy--in affected women, the pigment often fades a few months after delivery   Hormone treatments--oral contraceptive pills containing oestrogen and/or progesterone, hormone replacement, intrauterine devices and implants are a factor in about a quarter of affected women   Certain medications (including new targeted therapies for cancer), scented or deodorant soaps, toiletries and cosmetics--these may cause a phototoxic reaction that triggers melasma, which may then persist long term   Hypothyroidism (low levels of circulating thyroid hormone)     Melasma commonly arises in healthy, non-pregnant adults  Lifelong sun exposure causes deposition of pigment within the dermis and this often persists longterm  Exposure to ultraviolet radiation (UVR) deepens the pigmentation because it activates the melanocytes to produce more melanin  Research is attempting to pinpoint the roles of stem cell, neural, vascular and local hormonal factors in promoting melanocyte activation  What are the clinical features of melasma? Melasma presents as macules (freckle-like spots) and larger flat brown patches  These are found on both sides of the face and have an irregular border  There are several distinct patterns  Centrofacial pattern: forehead, cheeks, nose and upper lips   Malar pattern: cheeks and nose   Lateral cheek pattern   Mandibular pattern: jawline   Reddened or inflamed forms of melasma (also called erythrosis pigmentosa faciei)   Poikiloderma of [place]: reddened, photoaging changes seen on the sides of the neck, mostly affecting patients older than 50 years   Brachial type of melasma affecting shoulders and upper arms (also called acquired brachial cutaneous dyschromatosis)  Melasma is sometimes  into epidermal (skin surface), dermal (deeper) and mixed types  A Wood lamp that emits black light (UVA1) may be used to identify the depth of the pigment    Some general classifications include the following:     Epidermal melasma  Well-defined border   Dark brown colour   Appears more obvious under black light   Responds well to treatment     Dermal melasma  Ill-defined border   Light brown or bluish in colour   Unchanged under black light   Responds poorly to treatment     Mixed melasma  The most common type   Combination of bluish, light and dark brown patches   Mixed pattern seen under black light   Partial improvement with treatment     What is the treatment of melasma? Melasma can be very slow to respond to treatment, especially if it has been present for a long time  Treatment may result in irritant contact dermatitis in patients with sensitive skin, and this can result in post-inflammatory pigmentation  Generally a combination of the following measures is helpful  General measures  Discontinue hormonal contraception  Year-round life-long sun protection  Wear a broad-brimmed hat  Use broad-spectrum very high protection factor (SPF 50+) sunscreen applied to the whole face daily, year-round  It should be reapplied every 2 hours if outdoors during the summer months  Sunscreens containing iron oxides are preferred, as they screen out some visible light as well as ultraviolet radiation  Alternatively or as well, use a make-up that contains sunscreen  Use a mild cleanser, and if the skin is dry, a light moisturiser  Cosmetic camouflage (make-up) is invaluable to disguise the pigment  Topical therapy  Tyrosinase inhibitors are the mainstay of treatment  The aim is to prevent new pigment formation by inhibiting formation of melanin by the melanocytes  Hydroquinone 2-4% as cream or lotion, applied accurately to pigmented areas at night for 2-4 months  This may cause contact dermatitis (stinging and redness) in 25% of patients   It should not be used in higher concentration or for prolonged courses as it has been associated with ochronosis (a bluish grey discolouration similar to that seen in alkaptonuria)  Azelaic acid cream, lotion or gel can be applied twice daily long term, and is safe in pregnancy  This may also sting  Kojic acid or kojic acid dipalmitate is often included in formulations, as it binds copper, required by L-DOPA (a cofactor of tyrosinase)  Kojic acid can cause irritant contact dermatitis and less commonly, allergic contact dermatitis  The mechanism of action of cysteamine cream is unclear, but is thought to involve inhibition of tyrosinase  A study of 50 patients with melasma found cysteamine cream to be significantly more effective than placebo cream    Ascorbic acid (vitamin C) also acts through copper to inhibit pigment production  It is well tolerated but highly unstable, so is usually combined with other agents  Methimazole (antithyroid drug) cream has been reported to reduce melanin synthesis and pigmentation in hydroquinone-resistant melasma  New agents under investigation include zinc sulfate mequinol, arbutin and deoxyarbutin (from berries), licorice extract, rucinol, resveratrol, 4-hydroxy-anisole, 2,5-dimethyl-4-hydroxy-3(2H)-furanone and/or N-acetyl glucosamine     Other active compounds used for melasma include:  Topical corticosteroids such as hydrocortisone  These work quickly to fade the colour and reduce the likelihood of contact dermatitis caused by other agents  Potent topical steroids are best avoided due to their potential to cause adverse effects  Soybean extract, which is thought to reduce the transfer of pigment from melanocytes to skin cells (keratinocytes) and to inhibit receptors  Tranexamic acid has been used experimentally for melasma as a cream or injected into the skin (mesotherapy), showing some benefit  It may cause allergy or irritation  Superficial or epidermal pigment can be peeled off  Peeling can also allow tyrosinase inhibitors to penetrate more effectively   These must be done carefully as peels may also induce post-inflammatory pigmentation  Topical alpha hydroxyacids including glycolic acid and lactic acid, as creams or as repeated superficial chemical peels, remove the surface skin and their low pH inhibits the activity of tyrosinase  Topical retinoids, such as tretinoin (a prescription medicine) are effective  Tretinoin can be hard to tolerate and sometimes causes contact dermatitis  Do not use during pregnancy  Salicylic acid, a common peeling ingredient in skin creams, can also be used for chemical peels, but it is not very effective in melasma  The most successful formulation has been a combination of hydroquinone, tretinoin, and moderate potency topical steroid  This has been found to result in improvement or clearance in up to 60-80% of those treated  Many other combinations of topical agents are in common use, as they are more effective than any one alone  However, these products are often expensive  Oral treatment of melasma  Oral medications for melasma are under investigation, including tranexamic acid  Tranexamic acid is a lysine analogue that inhibits plasmin and is usually used orally to stop bleeding  It reduces production of prostaglandins, the precursors of tyrosine  In low dose, tranexamic acid has been reported to be effective and safe in the treatment of melasma, providing patients have been carefully selected and are at low risk of thromboembolic disease  Glutathione is also under investigation as a systemic skin whitening agent, but has potentially serious adverse effects  Devices used to treat melasma  The ideal treatment for melasma would destroy the pigment, while leaving the cells alone  Unfortunately, this is hard to achieve  Machines can be used to remove epidermal pigmentation but with caution--over-treatment may cause postinflammatory pigmentation  Patients should be pretreated with a tyrosinase inhibitor (see above)    Fractional lasers, Q-switched Nd:YAG lasers and intense pulsed light (IPL) appear to be the most suitable options  Several treatments may be necessary and post-inflammatory hyperpigmentation may complicate recovery  Carbon dioxide or erbium:YAG resurfacing lasers, pigment lasers (Q-switched lui and Alexandrite devices) and mechanical dermabrasion and microdermabrasion should be used with caution in the treatment of melasma  What is the outcome of treatment of melasma? Results take time and the above measures are rarely completely successful  Unfortunately, even in those that get a good result from treatment, pigmentation may reappear on exposure to summer sun and/or because of hormonal factors  New topical and oral agents are being studied and offer hope for effective treatments in the future

## 2022-10-04 NOTE — PROGRESS NOTES
[facility] Follow Up Note    Patient Name: Lio Irwin  Encounter Date: 10/04/2022    Today's Chief Concerns:  Concern #1:  Follow up Rosacea   Concern #2:  Follow up Acne       Current Medications:    Current Outpatient Medications:     Azelaic Acid (Finacea) 15 % FOAM, Apply topically once or twice a day to a face area , Disp: 50 g, Rfl: 5    clindamycin (CLEOCIN T) 1 % lotion, Apply topically 2 (two) times a day Apply topically to affected areas twice a day , Disp: 60 mL, Rfl: 5    clindamycin (CLEOCIN T) 1 %, Apply 1 pad topically 2 (two) times a day, Disp: 30 pad, Rfl: 5    fluticasone (FLONASE) 50 mcg/act nasal spray, 1 spray into each nostril daily Using as needed, Disp: , Rfl:     Junel FE 1 5/30 1 5-30 MG-MCG tablet, ONE PILL DAILY, SKIP THE INACTIVE PILLS AND RESTART A NEW PACK IMMEDIATELY, Disp: , Rfl:     levothyroxine 50 mcg tablet, levothyroxine 50 mcg tablet, Disp: , Rfl:     Multiple Vitamin (MULTI-VITAMIN DAILY PO), Take by mouth, Disp: , Rfl:     spironolactone (ALDACTONE) 50 mg tablet, TAKE 1 TABLET BY MOUTH EVERY MORNING, Disp: 90 tablet, Rfl: 3    amphetamine-dextroamphetamine (ADDERALL XR) 10 MG 24 hr capsule, dextroamphetamine-amphetamine ER 10 mg 24hr capsule,extend release (Patient not taking: Reported on 10/4/2022), Disp: , Rfl:     CONSTITUTIONAL:   Vitals:    10/04/22 1325   Temp: 98 3 °F (36 8 °C)   TempSrc: Temporal   Weight: 83 6 kg (184 lb 4 8 oz)   Height: 5' 8" (1 727 m)         Specific Alerts:    Have you been seen by a [facility] in the last 3 years? YES    Are you pregnant or planning to become pregnant? No    Are you currently or planning to be nursing or breast feeding? No    Allergies   Allergen Reactions    Benzoyl Peroxide Rash     Other reaction(s): itching, swelling  Other reaction(s): itching, swelling         May we call your Preferred Phone number to discuss your specific medical information?  YES    May we leave a detailed message that includes your specific medical information? YES    Have you traveled outside of the [facility] in the past 3 months? No    Do you currently have a pacemaker or defibrillator? No    Do you have any artificial heart valves, joints, plates, screws, rods, stents, pins, etc? No    Do you require any medications prior to a surgical procedure? No    Are you taking any medications that cause you to bleed more easily ("blood thinners") No    Have you ever experienced a rapid heartbeat with epinephrine? No    Have you ever been treated with "gold" (gold sodium thiomalate) therapy? No    [facility] can help with wrinkles, "laugh lines," facial volume loss, "double chin," "love handles," age spots, and more  Are you interested in learning today about some of the skin enhancement procedures that we offer? (If Yes, please provide more detail) No    Review of Systems:  Have you recently had or currently have any of the following?     Fever or chills: No  Night Sweats: YES  Headaches: YES  Weight Gain: No  Weight Loss: No  Blurry Vision: YES  Nausea: No  Vomiting: No  Diarrhea: YES  Blood in Stool: No  Abdominal Pain: No  Itchy Skin: YES  Painful Joints: YES  Swollen Joints: YES  Muscle Pain: YES  Irregular Mole: No  Sun Burn: No  Dry Skin: YES  Skin Color Changes: No  Scar or Keloid: No  Cold Sores/Fever Blisters: No  Bacterial Infections/MRSA: No  Anxiety: No  Depression: No  Suicidal or Homicidal Thoughts: No      PSYCH: Normal mood and affect  EYES: Normal conjunctiva  ENT: Normal lips and oral mucosa  CARDIOVASCULAR: No edema  RESPIRATORY: Normal respirations  HEME/LYMPH/IMMUNO:  No regional lymphadenopathy except as noted below in ASSESSMENT AND PLAN BY DIAGNOSIS    FULL ORGAN SYSTEM SKIN EXAM (SKIN)   Hair, Scalp, Ears, Face Normal except as noted below in Assessment   Neck, Cervical Chain Nodes Normal except as noted below in Assessment   Right Arm/Hand/Fingers Normal except as noted below in Assessment   Left Arm/Hand/Fingers Normal except as noted below in Assessment   Chest/Breasts/Axillae Viewed areas Normal except as noted below in Assessment   Abdomen, Umbilicus Normal except as noted below in Assessment   Back/Spine Normal except as noted below in Assessment   Groin/Genitalia/Buttocks Viewed areas Normal except as noted below in Assessment   Right Leg, Foot, Toes Normal except as noted below in Assessment   Left Leg, Foot, Toes Normal except as noted below in Assessment         1  ACNE VULGARIS ("COMMON ACNE")  2  ROSACEA     Physical Exam:  Anatomic Location Affected: face and buttock   Morphological Description: Open/Closed Comedones, inflammatory papules, pustules, and nodules     Additional History of Present Condition:  Patient is currently using Spironolactone 50 mg tablets daily, Clindamycin Lotion twice daily, and Finacea 15% foam      Assessment and Plan: We reviewed the causes of acne, the kinds of acne, and the expected clinical course  We discussed treatment options ranging from over-the-counter products, topical retinoids, antibiotics, BP, hormonal therapies (OCPs/spironolactone), and isotretinoin (Accutane)  We reviewed specific over-the-counter interventions and medications  Recommended typical hygiene measures washing regularly with mild cleanser, and refraining from picking and popping any pimples  Recommended non-comedogenic sunscreen use daily  Expectations of therapy discussed  Side effects, risks and benefits of medications discussed  A comprehensive handout with treatment plan provided  The phone number to call in case of questions or concerns (and instructions to stop medications in such a scenario) was provided  After lengthy discussion of etiology and treatment options, we decided to implement the following personalized treatment plan:      Mornin  Apply Clindamycin lotion to entire affected area twice daily   2  Clindamycin Pads as needed   3  Please continue your Azelaic Acid 15% foam once or twice daily  Can continue to use with over the counter as well    4  Follow with an oil-free sunscreen (SPF 30 or higher)  Some options include Neutrogena oil-free moisturizer with SPF     Night:    1  Wash your face with a gentle face wash - like Cetaphil wash, Cerave Hydrating , LaRoche Hydrating Cleanser       Spironolactone (prescription medicine) keep at same dose for now 50 mg - take 1 pill daily  -Start spironolactone; discussed risks, benefits, side effects including breast tenderness, breast enlargement, spotting, diuresis, dizziness   -Limit bananas and avocados to one daily  Avoid coconut water while on this medicine         Make sure all products you use on face are labeled as "non-comedongenic" or "oil-free" or " does not clog pores"  Acne can be frustrating and difficult to treat  Most acne regimens take 2-3 months to see an improvement, so stick with them  Don't give up! As always, call your doctor if you have any concerns about your medications  3  MELASMA     Physical Exam:  Anatomic Location Affected:  Chest and right lateral jawline  Morphological Description:  Hyperpigmented patch     Additional History of Present Condition:  Present for since this summer     Assessment and Plan:  Based on a thorough discussion of this condition and the management approach to it (including a comprehensive discussion of the known risks, side effects and potential benefits of treatment), the patient (family) agrees to implement the following specific plan:  Recommend using Azelaic Acid 15 foam   Can also try Differin   Neutrogena Daily Defense SPF 50+ at least three times a day     What is melasma? Melasma is a chronic skin disorder that results in symmetrical, blotchy, brownish facial pigmentation  It can lead to considerable embarrassment and distress    This form of facial pigmentation is sometimes called chloasma, but as this means green skin, the term melasma (brown skin) is preferred      Who gets melasma? Melasma is more common in women than in men; only 1-in-4 to 1-in-20 affected individuals are male, depending on the population studied  It generally starts between the age of 21 and 36 years, but it can begin in childhood or not until middle age  Melasma is more common in people that tan well or have naturally brown skin (Givens skin types 3 and 4) compared with those who have fair skin (skin types 1 and 2) or black skin (skin types 5 or 6)     What causes melasma? The cause of melasma is complex  The pigmentation is due to overproduction of melanin by the pigment cells, melanocytes, which is taken up by the keratinocytes (epidermal melanosis) and/or deposited in the dermis (dermal melanosis, melanophages)  There is a genetic predisposition to melasma, with at least one-third of patients reporting other family members to be affected  In most people melasma is a chronic disorder  Known triggers for melasma include:  Sun exposure and sun damage--this is the most important avoidable risk factor   Pregnancy--in affected women, the pigment often fades a few months after delivery   Hormone treatments--oral contraceptive pills containing oestrogen and/or progesterone, hormone replacement, intrauterine devices and implants are a factor in about a quarter of affected women   Certain medications (including new targeted therapies for cancer), scented or deodorant soaps, toiletries and cosmetics--these may cause a phototoxic reaction that triggers melasma, which may then persist long term   Hypothyroidism (low levels of circulating thyroid hormone)     Melasma commonly arises in healthy, non-pregnant adults  Lifelong sun exposure causes deposition of pigment within the dermis and this often persists longterm   Exposure to ultraviolet radiation (UVR) deepens the pigmentation because it activates the melanocytes to produce more melanin      Research is attempting to pinpoint the roles of stem cell, neural, vascular and local hormonal factors in promoting melanocyte activation      What are the clinical features of melasma? Melasma presents as macules (freckle-like spots) and larger flat brown patches  These are found on both sides of the face and have an irregular border  There are several distinct patterns  Centrofacial pattern: forehead, cheeks, nose and upper lips   Malar pattern: cheeks and nose   Lateral cheek pattern   Mandibular pattern: jawline   Reddened or inflamed forms of melasma (also called erythrosis pigmentosa faciei)   Poikiloderma of [place]: reddened, photoaging changes seen on the sides of the neck, mostly affecting patients older than 50 years   Brachial type of melasma affecting shoulders and upper arms (also called acquired brachial cutaneous dyschromatosis)      Melasma is sometimes  into epidermal (skin surface), dermal (deeper) and mixed types  A Wood lamp that emits black light (UVA1) may be used to identify the depth of the pigment  Some general classifications include the following:     Epidermal melasma  Well-defined border   Dark brown colour   Appears more obvious under black light   Responds well to treatment     Dermal melasma  Ill-defined border   Light brown or bluish in colour   Unchanged under black light   Responds poorly to treatment     Mixed melasma  The most common type   Combination of bluish, light and dark brown patches   Mixed pattern seen under black light   Partial improvement with treatment     What is the treatment of melasma? Melasma can be very slow to respond to treatment, especially if it has been present for a long time  Treatment may result in irritant contact dermatitis in patients with sensitive skin, and this can result in post-inflammatory pigmentation  Generally a combination of the following measures is helpful      General measures  Discontinue hormonal contraception     Year-round life-long sun protection  Wear a broad-brimmed hat  Use broad-spectrum very high protection factor (SPF 50+) sunscreen applied to the whole face daily, year-round  It should be reapplied every 2 hours if outdoors during the summer months  Sunscreens containing iron oxides are preferred, as they screen out some visible light as well as ultraviolet radiation  Alternatively or as well, use a make-up that contains sunscreen  Use a mild cleanser, and if the skin is dry, a light moisturiser  Cosmetic camouflage (make-up) is invaluable to disguise the pigment      Topical therapy  Tyrosinase inhibitors are the mainstay of treatment  The aim is to prevent new pigment formation by inhibiting formation of melanin by the melanocytes  Hydroquinone 2-4% as cream or lotion, applied accurately to pigmented areas at night for 2-4 months  This may cause contact dermatitis (stinging and redness) in 25% of patients  It should not be used in higher concentration or for prolonged courses as it has been associated with ochronosis (a bluish grey discolouration similar to that seen in alkaptonuria)  Azelaic acid cream, lotion or gel can be applied twice daily long term, and is safe in pregnancy  This may also sting  Kojic acid or kojic acid dipalmitate is often included in formulations, as it binds copper, required by L-DOPA (a cofactor of tyrosinase)  Kojic acid can cause irritant contact dermatitis and less commonly, allergic contact dermatitis  The mechanism of action of cysteamine cream is unclear, but is thought to involve inhibition of tyrosinase  A study of 50 patients with melasma found cysteamine cream to be significantly more effective than placebo cream    Ascorbic acid (vitamin C) also acts through copper to inhibit pigment production  It is well tolerated but highly unstable, so is usually combined with other agents     Methimazole (antithyroid drug) cream has been reported to reduce melanin synthesis and pigmentation in hydroquinone-resistant melasma  New agents under investigation include zinc sulfate mequinol, arbutin and deoxyarbutin (from berries), licorice extract, rucinol, resveratrol, 4-hydroxy-anisole, 2,5-dimethyl-4-hydroxy-3(2H)-furanone and/or N-acetyl glucosamine     Other active compounds used for melasma include:  Topical corticosteroids such as hydrocortisone  These work quickly to fade the colour and reduce the likelihood of contact dermatitis caused by other agents  Potent topical steroids are best avoided due to their potential to cause adverse effects  Soybean extract, which is thought to reduce the transfer of pigment from melanocytes to skin cells (keratinocytes) and to inhibit receptors  Tranexamic acid has been used experimentally for melasma as a cream or injected into the skin (mesotherapy), showing some benefit  It may cause allergy or irritation      Superficial or epidermal pigment can be peeled off  Peeling can also allow tyrosinase inhibitors to penetrate more effectively  These must be done carefully as peels may also induce post-inflammatory pigmentation  Topical alpha hydroxyacids including glycolic acid and lactic acid, as creams or as repeated superficial chemical peels, remove the surface skin and their low pH inhibits the activity of tyrosinase  Topical retinoids, such as tretinoin (a prescription medicine) are effective  Tretinoin can be hard to tolerate and sometimes causes contact dermatitis  Do not use during pregnancy  Salicylic acid, a common peeling ingredient in skin creams, can also be used for chemical peels, but it is not very effective in melasma      The most successful formulation has been a combination of hydroquinone, tretinoin, and moderate potency topical steroid  This has been found to result in improvement or clearance in up to 60-80% of those treated   Many other combinations of topical agents are in common use, as they are more effective than any one alone  However, these products are often expensive      Oral treatment of melasma  Oral medications for melasma are under investigation, including tranexamic acid  Tranexamic acid is a lysine analogue that inhibits plasmin and is usually used orally to stop bleeding  It reduces production of prostaglandins, the precursors of tyrosine  In low dose, tranexamic acid has been reported to be effective and safe in the treatment of melasma, providing patients have been carefully selected and are at low risk of thromboembolic disease  Glutathione is also under investigation as a systemic skin whitening agent, but has potentially serious adverse effects      Devices used to treat melasma  The ideal treatment for melasma would destroy the pigment, while leaving the cells alone  Unfortunately, this is hard to achieve  Machines can be used to remove epidermal pigmentation but with caution--over-treatment may cause postinflammatory pigmentation  Patients should be pretreated with a tyrosinase inhibitor (see above)  Fractional lasers, Q-switched Nd:YAG lasers and intense pulsed light (IPL) appear to be the most suitable options  Several treatments may be necessary and post-inflammatory hyperpigmentation may complicate recovery  Carbon dioxide or erbium:YAG resurfacing lasers, pigment lasers (Q-switched lui and Alexandrite devices) and mechanical dermabrasion and microdermabrasion should be used with caution in the treatment of melasma      What is the outcome of treatment of melasma? Results take time and the above measures are rarely completely successful  Unfortunately, even in those that get a good result from treatment, pigmentation may reappear on exposure to summer sun and/or because of hormonal factors  New topical and oral agents are being studied and offer hope for effective treatments in the future  Seeing Dr Arnulfo Rowley re high TAHIR titers and negative subsets    I suspect this is incidental due to thyroid and autoimmune tendencies, but due to review of systems and rising titers appreciate rheumatlogy support      Scribe Attestation    I,:  Rachel Toro am acting as a scribe while in the presence of the attending physician :       I,:  Bharti Blanco MD personally performed the services described in this documentation    as scribed in my presence :

## 2022-10-06 PROBLEM — F90.9 ATTENTION DEFICIT HYPERACTIVITY DISORDER (ADHD): Status: ACTIVE | Noted: 2022-10-06

## 2022-10-06 PROBLEM — K58.0 IRRITABLE BOWEL SYNDROME WITH DIARRHEA: Status: ACTIVE | Noted: 2022-10-06

## 2022-10-06 PROBLEM — M25.50 PAIN IN JOINT, MULTIPLE SITES: Status: ACTIVE | Noted: 2022-10-06

## 2022-10-06 PROBLEM — M47.816 LUMBAR SPONDYLOSIS: Status: ACTIVE | Noted: 2022-10-06

## 2022-10-06 PROBLEM — E03.8 HYPOTHYROIDISM DUE TO HASHIMOTO'S THYROIDITIS: Status: ACTIVE | Noted: 2022-10-06

## 2022-10-06 PROBLEM — F41.9 ANXIETY: Status: ACTIVE | Noted: 2022-10-06

## 2022-10-06 PROBLEM — M79.7 FIBROMYALGIA: Status: ACTIVE | Noted: 2022-10-06

## 2022-10-06 PROBLEM — M35.9 UNDIFFERENTIATED CONNECTIVE TISSUE DISEASE (HCC): Status: ACTIVE | Noted: 2022-10-06

## 2022-10-06 PROBLEM — L71.9 ACNE ROSACEA: Status: ACTIVE | Noted: 2022-10-06

## 2022-10-06 PROBLEM — M54.81 BILATERAL OCCIPITAL NEURALGIA: Status: ACTIVE | Noted: 2022-10-06

## 2022-10-06 PROBLEM — M79.11: Status: ACTIVE | Noted: 2022-10-06

## 2022-10-06 PROBLEM — E06.3 HYPOTHYROIDISM DUE TO HASHIMOTO'S THYROIDITIS: Status: ACTIVE | Noted: 2022-10-06

## 2022-10-06 PROBLEM — M24.80 GENERALIZED ARTICULAR HYPERMOBILITY: Status: ACTIVE | Noted: 2022-10-06

## 2022-10-09 PROBLEM — G56.23 CUBITAL TUNNEL SYNDROME OF BOTH UPPER EXTREMITIES: Status: ACTIVE | Noted: 2022-10-09

## 2023-04-06 PROBLEM — L20.9 ATOPIC DERMATITIS: Status: ACTIVE | Noted: 2023-04-06

## 2023-04-06 PROBLEM — D72.819 CHRONIC LEUKOPENIA: Status: ACTIVE | Noted: 2023-04-06

## 2023-05-23 ENCOUNTER — OFFICE VISIT (OUTPATIENT)
Dept: DERMATOLOGY | Facility: CLINIC | Age: 48
End: 2023-05-23

## 2023-05-23 VITALS — HEIGHT: 68 IN | BODY MASS INDEX: 25.1 KG/M2 | WEIGHT: 165.6 LBS | TEMPERATURE: 97.6 F

## 2023-05-23 DIAGNOSIS — L70.0 ACNE VULGARIS: Primary | ICD-10-CM

## 2023-05-23 DIAGNOSIS — L81.1 MELASMA: ICD-10-CM

## 2023-05-23 DIAGNOSIS — L82.1 SEBORRHEIC KERATOSIS: ICD-10-CM

## 2023-05-23 DIAGNOSIS — L71.9 ROSACEA: ICD-10-CM

## 2023-05-23 NOTE — PATIENT INSTRUCTIONS
"1  ACNE VULGARIS (\"COMMON ACNE\")  2  ROSACEA         Assessment and Plan: We reviewed the causes of acne, the “kinds” of acne, and the expected clinical course  We discussed treatment options ranging from over-the-counter products, topical retinoids, antibiotics, BP, hormonal therapies (OCPs/spironolactone), and isotretinoin (Accutane)  We reviewed specific over-the-counter interventions and medications  Recommended typical hygiene measures washing regularly with mild cleanser, and refraining from picking and popping any pimples  Recommended non-comedogenic sunscreen use daily  Expectations of therapy discussed  Side effects, risks and benefits of medications discussed  A comprehensive handout with treatment plan provided  The phone number to call in case of questions or concerns (and instructions to stop medications in such a scenario) was provided  After lengthy discussion of etiology and treatment options, we decided to implement the following personalized treatment plan:        Mornin  Apply Clindamycin lotion to entire affected area twice daily   2  Clindamycin Pads as needed   3  Please continue your Azelaic Acid 15% foam once or twice daily including to buttock when flaring  Can continue to use with over the counter as well    4  Follow with an oil-free sunscreen (SPF 30 or higher)  Some options include Neutrogena oil-free moisturizer with SPF   5  Discussed increasing the spironolactone dose in the future if needed  6  Use of antibiotics not recommended-could raise TAHIR levels  Night:     1  Wash your face with a gentle face wash - like Cetaphil wash, Cerave Hydrating , LaRoche Hydrating Cleanser         Spironolactone (prescription medicine) keep at same dose for now 50 mg - take 1 pill daily  -Start spironolactone; discussed risks, benefits, side effects including breast tenderness, breast enlargement, spotting, diuresis, dizziness   -Limit bananas and avocados to one daily   Avoid " "coconut water while on this medicine            Make sure all products you use on face are labeled as \"non-comedongenic\" or \"oil-free\" or \" does not clog pores\"  Acne can be frustrating and difficult to treat  Most acne regimens take 2-3 months to see an improvement, so stick with them  Don't give up! As always, call your doctor if you have any concerns about your medications  3  MELASMA     Assessment and Plan:  Based on a thorough discussion of this condition and the management approach to it (including a comprehensive discussion of the known risks, side effects and potential benefits of treatment), the patient (family) agrees to implement the following specific plan:  Recommend continuing Azelaic Acid 15 foam   Can also try Differin   Neutrogena Daily Defense SPF 50+ at least three times a day     What is melasma? Melasma is a chronic skin disorder that results in symmetrical, blotchy, brownish facial pigmentation  It can lead to considerable embarrassment and distress  This form of facial pigmentation is sometimes called chloasma, but as this means green skin, the term melasma (brown skin) is preferred  Who gets melasma? Melasma is more common in women than in men; only 1-in-4 to 1-in-20 affected individuals are male, depending on the population studied  It generally starts between the age of 21 and 36 years, but it can begin in childhood or not until middle age  Melasma is more common in people that tan well or have naturally brown skin (Givens skin types 3 and 4) compared with those who have fair skin (skin types 1 and 2) or black skin (skin types 5 or 6)  What causes melasma? The cause of melasma is complex  The pigmentation is due to overproduction of melanin by the pigment cells, melanocytes, which is taken up by the keratinocytes (epidermal melanosis) and/or deposited in the dermis (dermal melanosis, melanophages)   There is a genetic predisposition to melasma, with at least " one-third of patients reporting other family members to be affected  In most people melasma is a chronic disorder  Known triggers for melasma include:  Sun exposure and sun damage--this is the most important avoidable risk factor   Pregnancy--in affected women, the pigment often fades a few months after delivery   Hormone treatments--oral contraceptive pills containing oestrogen and/or progesterone, hormone replacement, intrauterine devices and implants are a factor in about a quarter of affected women   Certain medications (including new targeted therapies for cancer), scented or deodorant soaps, toiletries and cosmetics--these may cause a phototoxic reaction that triggers melasma, which may then persist long term   Hypothyroidism (low levels of circulating thyroid hormone)     Melasma commonly arises in healthy, non-pregnant adults  Lifelong sun exposure causes deposition of pigment within the dermis and this often persists longterm  Exposure to ultraviolet radiation (UVR) deepens the pigmentation because it activates the melanocytes to produce more melanin  Research is attempting to pinpoint the roles of stem cell, neural, vascular and local hormonal factors in promoting melanocyte activation  What are the clinical features of melasma? Melasma presents as macules (freckle-like spots) and larger flat brown patches  These are found on both sides of the face and have an irregular border  There are several distinct patterns  Centrofacial pattern: forehead, cheeks, nose and upper lips   Malar pattern: cheeks and nose   Lateral cheek pattern   Mandibular pattern: jawline   Reddened or inflamed forms of melasma (also called erythrosis pigmentosa faciei)   Poikiloderma of [place]: reddened, photoaging changes seen on the sides of the neck, mostly affecting patients older than 50 years   Brachial type of melasma affecting shoulders and upper arms (also called acquired brachial cutaneous dyschromatosis)  Melasma is sometimes  into epidermal (skin surface), dermal (deeper) and mixed types  A Wood lamp that emits black light (UVA1) may be used to identify the depth of the pigment  Some general classifications include the following:     Epidermal melasma  Well-defined border   Dark brown colour   Appears more obvious under black light   Responds well to treatment     Dermal melasma  Ill-defined border   Light brown or bluish in colour   Unchanged under black light   Responds poorly to treatment\     Mixed melasma  The most common type   Combination of bluish, light and dark brown patches   Mixed pattern seen under black light   Partial improvement with treatment     What is the treatment of melasma? Melasma can be very slow to respond to treatment, especially if it has been present for a long time  Treatment may result in irritant contact dermatitis in patients with sensitive skin, and this can result in post-inflammatory pigmentation  Generally a combination of the following measures is helpful  General measures  Discontinue hormonal contraception  Year-round life-long sun protection  Wear a broad-brimmed hat  Use broad-spectrum very high protection factor (SPF 50+) sunscreen applied to the whole face daily, year-round  It should be reapplied every 2 hours if outdoors during the summer months  Sunscreens containing iron oxides are preferred, as they screen out some visible light as well as ultraviolet radiation  Alternatively or as well, use a make-up that contains sunscreen  Use a mild cleanser, and if the skin is dry, a light moisturiser  Cosmetic camouflage (make-up) is invaluable to disguise the pigment  Topical therapy  Tyrosinase inhibitors are the mainstay of treatment  The aim is to prevent new pigment formation by inhibiting formation of melanin by the melanocytes  Hydroquinone 2-4% as cream or lotion, applied accurately to pigmented areas at night for 2-4 months   This may cause contact dermatitis (stinging and redness) in 25% of patients  It should not be used in higher concentration or for prolonged courses as it has been associated with ochronosis (a bluish grey discolouration similar to that seen in alkaptonuria)  Azelaic acid cream, lotion or gel can be applied twice daily long term, and is safe in pregnancy  This may also sting  Kojic acid or kojic acid dipalmitate is often included in formulations, as it binds copper, required by L-DOPA (a cofactor of tyrosinase)  Kojic acid can cause irritant contact dermatitis and less commonly, allergic contact dermatitis  The mechanism of action of cysteamine cream is unclear, but is thought to involve inhibition of tyrosinase  A study of 50 patients with melasma found cysteamine cream to be significantly more effective than placebo cream    Ascorbic acid (vitamin C) also acts through copper to inhibit pigment production  It is well tolerated but highly unstable, so is usually combined with other agents  Methimazole (antithyroid drug) cream has been reported to reduce melanin synthesis and pigmentation in hydroquinone-resistant melasma  New agents under investigation include zinc sulfate mequinol, arbutin and deoxyarbutin (from berries), licorice extract, rucinol, resveratrol, 4-hydroxy-anisole, 2,5-dimethyl-4-hydroxy-3(2H)-furanone and/or N-acetyl glucosamine     Other active compounds used for melasma include:  Topical corticosteroids such as hydrocortisone  These work quickly to fade the colour and reduce the likelihood of contact dermatitis caused by other agents  Potent topical steroids are best avoided due to their potential to cause adverse effects  Soybean extract, which is thought to reduce the transfer of pigment from melanocytes to skin cells (keratinocytes) and to inhibit receptors  Tranexamic acid has been used experimentally for melasma as a cream or injected into the skin (mesotherapy), showing some benefit   It may cause allergy or irritation  Superficial or epidermal pigment can be peeled off  Peeling can also allow tyrosinase inhibitors to penetrate more effectively  These must be done carefully as peels may also induce post-inflammatory pigmentation  Topical alpha hydroxyacids including glycolic acid and lactic acid, as creams or as repeated superficial chemical peels, remove the surface skin and their low pH inhibits the activity of tyrosinase  Topical retinoids, such as tretinoin (a prescription medicine) are effective  Tretinoin can be hard to tolerate and sometimes causes contact dermatitis  Do not use during pregnancy  Salicylic acid, a common peeling ingredient in skin creams, can also be used for chemical peels, but it is not very effective in melasma  The most successful formulation has been a combination of hydroquinone, tretinoin, and moderate potency topical steroid  This has been found to result in improvement or clearance in up to 60-80% of those treated  Many other combinations of topical agents are in common use, as they are more effective than any one alone  However, these products are often expensive  Oral treatment of melasma  Oral medications for melasma are under investigation, including tranexamic acid  Tranexamic acid is a lysine analogue that inhibits plasmin and is usually used orally to stop bleeding  It reduces production of prostaglandins, the precursors of tyrosine  In low dose, tranexamic acid has been reported to be effective and safe in the treatment of melasma, providing patients have been carefully selected and are at low risk of thromboembolic disease  Glutathione is also under investigation as a systemic skin whitening agent, but has potentially serious adverse effects  Devices used to treat melasma  The ideal treatment for melasma would destroy the pigment, while leaving the cells alone  Unfortunately, this is hard to achieve   Machines can be used to remove epidermal pigmentation but with caution--over-treatment may cause postinflammatory pigmentation  Patients should be pretreated with a tyrosinase inhibitor (see above)  Fractional lasers, Q-switched Nd:YAG lasers and intense pulsed light (IPL) appear to be the most suitable options  Several treatments may be necessary and post-inflammatory hyperpigmentation may complicate recovery  Carbon dioxide or erbium:YAG resurfacing lasers, pigment lasers (Q-switched lui and Alexandrite devices) and mechanical dermabrasion and microdermabrasion should be used with caution in the treatment of melasma  What is the outcome of treatment of melasma? Results take time and the above measures are rarely completely successful  Unfortunately, even in those that get a good result from treatment, pigmentation may reappear on exposure to summer sun and/or because of hormonal factors  New topical and oral agents are being studied and offer hope for effective treatments in the future  4  SEBORRHEIC KERATOSIS; NON-INFLAMED    Assessment and Plan:  Based on a thorough discussion of this condition and the management approach to it (including a comprehensive discussion of the known risks, side effects and potential benefits of treatment), the patient (family) agrees to implement the following specific plan:  Reassured benign    Seborrheic Keratosis  A seborrheic keratosis is a harmless warty spot that appears during adult life as a common sign of skin aging  Seborrheic keratoses can arise on any area of skin, covered or uncovered, with the usual exception of the palms and soles  They do not arise from mucous membranes  Seborrheic keratoses can have highly variable appearance  Seborrheic keratoses are extremely common  It has been estimated that over 90% of adults over the age of 61 years have one or more of them  They occur in males and females of all races, typically beginning to erupt in the 35s or 45s   They are uncommon under the "age of 21 years  The precise cause of seborrhoeic keratoses is not known  Seborrhoeic keratoses are considered degenerative in nature  As time goes by, seborrheic keratoses tend to become more numerous  Some people inherit a tendency to develop a very large number of them; some people may have hundreds of them  The name \"seborrheic keratosis\" is misleading, because these lesions are not limited to a seborrhoeic distribution (scalp, mid-face, chest, upper back), nor are they formed from sebaceous glands, nor are they associated with sebum -- which is greasy  Seborrheic keratosis may also be called \"SK,\" \"Seb K,\" \"basal cell papilloma,\" \"senile wart,\" or \"barnacle  \"      Researchers have noted:  Eruptive seborrhoeic keratoses can follow sunburn or dermatitis  Skin friction may be the reason they appear in body folds  Viral cause (e g , human papillomavirus) seems unlikely  Stable and clonal mutations or activation of FRFR3, PIK3CA, DEANDRE, AKT1 and EGFR genes are found in seborrhoeic keratoses  Seborrhoeic keratosis can arise from solar lentigo  FRFR3 mutations also arise in solar lentigines  These mutations are associated with increased age and location on the head and neck, suggesting a role of ultraviolet radiation in these lesions  Seborrheic keratoses do not harbour tumour suppressor gene mutations  Epidermal growth factor receptor inhibitors, which are used to treat some cancers, often result in an increase in verrucal (warty) keratoses  There is no easy way to remove multiple lesions on a single occasion  Unless a specific lesion is \"inflamed\" and is causing pain or stinging/burning or is bleeding, most insurance companies do not authorize treatment    "

## 2023-05-23 NOTE — PROGRESS NOTES
"[facility] Note     Patient Name: Yolanda Curtis  Encounter Date: 05/23/2023     Have you been cared for by a [facility] in the last 3 years and, if so, which description applies to you? Yes  I have been here within the last 3 years, and my medical history has NOT changed since that time  I am FEMALE/of child-bearing potential     REVIEW OF SYSTEMS:  Have you recently had or currently have any of the following? · No changes in my recent health  PAST MEDICAL HISTORY:  Have you personally ever had or currently have any of the following? If \"YES,\" then please provide more detail  · No changes in my medical history  FAMILY HISTORY:  Any \"first degree relatives\" (parent, brother, sister, or child) with the following? • No changes in my family's known health  PATIENT EXPERIENCE:    • Do you want the Dermatologist to perform a COMPLETE skin exam today including a clinical examination under the \"bra and underwear\" areas? NO  • If necessary, do we have your permission to call and leave a detailed message on your Preferred Phone number that includes your specific medical information? Yes      Allergies   Allergen Reactions   • Benzoyl Peroxide Rash     Other reaction(s): itching, swelling  Other reaction(s): itching, swelling        Current Outpatient Medications:   •  amphetamine-dextroamphetamine (ADDERALL XR) 10 MG 24 hr capsule, dextroamphetamine-amphetamine ER 10 mg 24hr capsule,extend release (Patient not taking: Reported on 10/4/2022), Disp: , Rfl:   •  Azelaic Acid (Finacea) 15 % FOAM, Apply topically once or twice a day to a face area , Disp: 50 g, Rfl: 5  •  clindamycin (CLEOCIN T) 1 % lotion, Apply topically 2 (two) times a day Apply topically to affected areas twice a day   (Patient not taking: Reported on 4/5/2023), Disp: 60 mL, Rfl: 5  •  clindamycin (CLEOCIN T) 1 %, Apply 1 pad topically 2 (two) times a day, Disp: 30 pad, Rfl: 5  •  fluticasone (FLONASE) 50 " "mcg/act nasal spray, 1 spray into each nostril daily Using as needed, Disp: , Rfl:   •  Junel FE 1 5/30 1 5-30 MG-MCG tablet, ONE PILL DAILY, SKIP THE INACTIVE PILLS AND RESTART A NEW PACK IMMEDIATELY, Disp: , Rfl:   •  levothyroxine 50 mcg tablet, levothyroxine 50 mcg tablet, Disp: , Rfl:   •  Multiple Vitamin (MULTI-VITAMIN DAILY PO), Take by mouth, Disp: , Rfl:   •  spironolactone (ALDACTONE) 50 mg tablet, TAKE 1 TABLET BY MOUTH EVERY MORNING, Disp: 90 tablet, Rfl: 3          • Whom besides the patient is providing clinical information about today's encounter?   o NO ADDITIONAL HISTORIAN (patient alone provided history)    Physical Exam and Assessment/Plan by Diagnosis:  1  ACNE VULGARIS (\"COMMON ACNE\")  2  ROSACEA      Physical Exam:  • Anatomic Location Affected: face and buttock   • Morphological Description: Open/Closed Comedones, inflammatory papules, pustules, and nodules-improving     Additional History of Present Condition:  Patient is currently using Spironolactone 50 mg tablets daily, Clindamycin Lotion twice daily, and Finacea 15% foam  Patient reports she has not been using the [facility] foam consistently      Assessment and Plan:  • We reviewed the causes of acne, the “kinds” of acne, and the expected clinical course  • We discussed treatment options ranging from over-the-counter products, topical retinoids, antibiotics, BP, hormonal therapies (OCPs/spironolactone), and isotretinoin (Accutane)  • We reviewed specific over-the-counter interventions and medications  Recommended typical hygiene measures washing regularly with mild cleanser, and refraining from picking and popping any pimples  Recommended non-comedogenic sunscreen use daily  • Expectations of therapy discussed  Side effects, risks and benefits of medications discussed  A comprehensive handout with treatment plan provided   The phone number to call in case of questions or concerns (and instructions to stop medications in such a scenario) " "was provided  • After lengthy discussion of etiology and treatment options, we decided to implement the following personalized treatment plan:        Mornin  Apply Clindamycin lotion to entire affected area twice daily   2  Clindamycin Pads as needed   3  Please continue your Azelaic Acid 15% foam once or twice daily including to buttock when flaring  Can continue to use with over the counter as well    4  Follow with an oil-free sunscreen (SPF 30 or higher)  Some options include Neutrogena oil-free moisturizer with SPF   5  Discussed increasing the spironolactone dose in the future if needed  6  Use of antibiotics not recommended-could raise TAHIR levels      Night:     1  Wash your face with a gentle face wash - like Cetaphil wash, Cerave Hydrating , LaRoche Hydrating Cleanser         Spironolactone (prescription medicine) keep at same dose for now 50 mg - take 1 pill daily  -Start spironolactone; discussed risks, benefits, side effects including breast tenderness, breast enlargement, spotting, diuresis, dizziness   -Limit bananas and avocados to one daily  Avoid coconut water while on this medicine            Make sure all products you use on face are labeled as \"non-comedongenic\" or \"oil-free\" or \" does not clog pores\"     Acne can be frustrating and difficult to treat  Most acne regimens take 2-3 months to see an improvement, so stick with them  Don't give up! As always, call your doctor if you have any concerns about your medications      3  MELASMA     Physical Exam:  • Anatomic Location Affected:  Chest and right lateral jawline-improved  • Morphological Description:  Hyperpigmented patch     Additional History of Present Condition:  Present for since last summer   Patient followed up with Dr Author Mcclain regarding elevated TAHIR-no concerns      Assessment and Plan:  Based on a thorough discussion of this condition and the management approach to it (including a comprehensive discussion of the known " risks, side effects and potential benefits of treatment), the patient (family) agrees to implement the following specific plan:  • Recommend continuing Azelaic Acid 15 foam   • Can also try Differin   • Neutrogena Daily Defense SPF 50+ at least three times a day     What is melasma? Melasma is a chronic skin disorder that results in symmetrical, blotchy, brownish facial pigmentation  It can lead to considerable embarrassment and distress   This form of facial pigmentation is sometimes called chloasma, but as this means green skin, the term melasma (brown skin) is preferred      Who gets melasma? Melasma is more common in women than in men; only 1-in-4 to 1-in-20 affected individuals are male, depending on the population studied  It generally starts between the age of 21 and 36 years, but it can begin in childhood or not until middle age  Melasma is more common in people that tan well or have naturally brown skin (Givens skin types 3 and 4) compared with those who have fair skin (skin types 1 and 2) or black skin (skin types 5 or 6)     What causes melasma? The cause of melasma is complex  The pigmentation is due to overproduction of melanin by the pigment cells, melanocytes, which is taken up by the keratinocytes (epidermal melanosis) and/or deposited in the dermis (dermal melanosis, melanophages)  There is a genetic predisposition to melasma, with at least one-third of patients reporting other family members to be affected  In most people melasma is a chronic disorder    Known triggers for melasma include:  • Sun exposure and sun damage--this is the most important avoidable risk factor   • Pregnancy--in affected women, the pigment often fades a few months after delivery   • Hormone treatments--oral contraceptive pills containing oestrogen and/or progesterone, hormone replacement, intrauterine devices and implants are a factor in about a quarter of affected women   • Certain medications (including new targeted therapies for cancer), scented or deodorant soaps, toiletries and cosmetics--these may cause a phototoxic reaction that triggers melasma, which may then persist long term   • Hypothyroidism (low levels of circulating thyroid hormone)     Melasma commonly arises in healthy, non-pregnant adults  Lifelong sun exposure causes deposition of pigment within the dermis and this often persists longterm  Exposure to ultraviolet radiation (UVR) deepens the pigmentation because it activates the melanocytes to produce more melanin      Research is attempting to pinpoint the roles of stem cell, neural, vascular and local hormonal factors in promoting melanocyte activation      What are the clinical features of melasma? Melasma presents as macules (freckle-like spots) and larger flat brown patches  These are found on both sides of the face and have an irregular border  There are several distinct patterns  • Centrofacial pattern: forehead, cheeks, nose and upper lips   • Malar pattern: cheeks and nose   • Lateral cheek pattern   • Mandibular pattern: jawline   • Reddened or inflamed forms of melasma (also called erythrosis pigmentosa faciei)   • Poikiloderma of [place]: reddened, photoaging changes seen on the sides of the neck, mostly affecting patients older than 50 years   • Brachial type of melasma affecting shoulders and upper arms (also called acquired brachial cutaneous dyschromatosis)      Melasma is sometimes  into epidermal (skin surface), dermal (deeper) and mixed types   A Wood lamp that emits black light (UVA1) may be used to identify the depth of the pigment   Some general classifications include the following:     Epidermal melasma  • Well-defined border   • Dark brown colour   • Appears more obvious under black light   • Responds well to treatment     Dermal melasma  • Ill-defined border   • Light brown or bluish in colour   • Unchanged under black light   • Responds poorly to treatment\     Mixed melasma  • The most common type   • Combination of bluish, light and dark brown patches   • Mixed pattern seen under black light   • Partial improvement with treatment     What is the treatment of melasma? Melasma can be very slow to respond to treatment, especially if it has been present for a long time  Treatment may result in irritant contact dermatitis in patients with sensitive skin, and this can result in post-inflammatory pigmentation   Generally a combination of the following measures is helpful      General measures  • Discontinue hormonal contraception  • Year-round life-long sun protection  Wear a broad-brimmed hat  • Use broad-spectrum very high protection factor (SPF 50+) sunscreen applied to the whole face daily, year-round  It should be reapplied every 2 hours if outdoors during the summer months  Sunscreens containing iron oxides are preferred, as they screen out some visible light as well as ultraviolet radiation  Alternatively or as well, use a make-up that contains sunscreen  • Use a mild cleanser, and if the skin is dry, a light moisturiser  • Cosmetic camouflage (make-up) is invaluable to disguise the pigment      Topical therapy  Tyrosinase inhibitors are the mainstay of treatment  The aim is to prevent new pigment formation by inhibiting formation of melanin by the melanocytes  • Hydroquinone 2-4% as cream or lotion, applied accurately to pigmented areas at night for 2-4 months  This may cause contact dermatitis (stinging and redness) in 25% of patients  It should not be used in higher concentration or for prolonged courses as it has been associated with ochronosis (a bluish grey discolouration similar to that seen in alkaptonuria)  • Azelaic acid cream, lotion or gel can be applied twice daily long term, and is safe in pregnancy  This may also sting     • Kojic acid or kojic acid dipalmitate is often included in formulations, as it binds copper, required by L-DOPA (a cofactor of tyrosinase)  Kojic acid can cause irritant contact dermatitis and less commonly, allergic contact dermatitis  • The mechanism of action of cysteamine cream is unclear, but is thought to involve inhibition of tyrosinase  A study of 50 patients with melasma found cysteamine cream to be significantly more effective than placebo cream    • Ascorbic acid (vitamin C) also acts through copper to inhibit pigment production  It is well tolerated but highly unstable, so is usually combined with other agents  • Methimazole (antithyroid drug) cream has been reported to reduce melanin synthesis and pigmentation in hydroquinone-resistant melasma  • New agents under investigation include zinc sulfate mequinol, arbutin and deoxyarbutin (from berries), licorice extract, rucinol, resveratrol, 4-hydroxy-anisole, 2,5-dimethyl-4-hydroxy-3(2H)-furanone and/or N-acetyl glucosamine     Other active compounds used for melasma include:  • Topical corticosteroids such as hydrocortisone  These work quickly to fade the colour and reduce the likelihood of contact dermatitis caused by other agents  Potent topical steroids are best avoided due to their potential to cause adverse effects  • Soybean extract, which is thought to reduce the transfer of pigment from melanocytes to skin cells (keratinocytes) and to inhibit receptors  • Tranexamic acid has been used experimentally for melasma as a cream or injected into the skin (mesotherapy), showing some benefit  It may cause allergy or irritation      Superficial or epidermal pigment can be peeled off  Peeling can also allow tyrosinase inhibitors to penetrate more effectively  These must be done carefully as peels may also induce post-inflammatory pigmentation  • Topical alpha hydroxyacids including glycolic acid and lactic acid, as creams or as repeated superficial chemical peels, remove the surface skin and their low pH inhibits the activity of tyrosinase     • Topical retinoids, such as tretinoin (a prescription medicine) are effective  Tretinoin can be hard to tolerate and sometimes causes contact dermatitis  Do not use during pregnancy  • Salicylic acid, a common peeling ingredient in skin creams, can also be used for chemical peels, but it is not very effective in melasma      The most successful formulation has been a combination of hydroquinone, tretinoin, and moderate potency topical steroid  This has been found to result in improvement or clearance in up to 60-80% of those treated  Many other combinations of topical agents are in common use, as they are more effective than any one alone  However, these products are often expensive      Oral treatment of melasma  Oral medications for melasma are under investigation, including tranexamic acid  Tranexamic acid is a lysine analogue that inhibits plasmin and is usually used orally to stop bleeding  It reduces production of prostaglandins, the precursors of tyrosine  In low dose, tranexamic acid has been reported to be effective and safe in the treatment of melasma, providing patients have been carefully selected and are at low risk of thromboembolic disease   Glutathione is also under investigation as a systemic skin whitening agent, but has potentially serious adverse effects      Devices used to treat melasma  The ideal treatment for melasma would destroy the pigment, while leaving the cells alone  Unfortunately, this is hard to achieve  Machines can be used to remove epidermal pigmentation but with caution--over-treatment may cause postinflammatory pigmentation  Patients should be pretreated with a tyrosinase inhibitor (see above)  Fractional lasers, Q-switched Nd:YAG lasers and intense pulsed light (IPL) appear to be the most suitable options  Several treatments may be necessary and post-inflammatory hyperpigmentation may complicate recovery    Carbon dioxide or erbium:YAG resurfacing lasers, pigment lasers (Q-switched lui and Alexandrite "devices) and mechanical dermabrasion and microdermabrasion should be used with caution in the treatment of melasma      What is the outcome of treatment of melasma? Results take time and the above measures are rarely completely successful   Unfortunately, even in those that get a good result from treatment, pigmentation may reappear on exposure to summer sun and/or because of hormonal factors  New topical and oral agents are being studied and offer hope for effective treatments in the future         4  SEBORRHEIC KERATOSIS; NON-INFLAMED    Physical Exam:  • Anatomic Location Affected:  Neck at hairline  • Morphological Description:  Flat and raised, waxy, smooth to warty textured, yellow to brownish-grey to dark brown to blackish, discrete, \"stuck-on\" appearing papules  • Pertinent Positives:  • Pertinent Negatives: Additional History of Present Condition:  Patient reports new bumps on the skin  Denies itch, burn, pain, bleeding or ulceration  Present constantly; nothing seems to make it worse or better  No prior treatment  Assessment and Plan:  Based on a thorough discussion of this condition and the management approach to it (including a comprehensive discussion of the known risks, side effects and potential benefits of treatment), the patient (family) agrees to implement the following specific plan:  • Reassured benign    Seborrheic Keratosis  A seborrheic keratosis is a harmless warty spot that appears during adult life as a common sign of skin aging  Seborrheic keratoses can arise on any area of skin, covered or uncovered, with the usual exception of the palms and soles  They do not arise from mucous membranes  Seborrheic keratoses can have highly variable appearance  Seborrheic keratoses are extremely common  It has been estimated that over 90% of adults over the age of 61 years have one or more of them   They occur in males and females of all races, typically beginning to erupt in the 35s or " "45s  They are uncommon under the age of 21 years  The precise cause of seborrhoeic keratoses is not known  Seborrhoeic keratoses are considered degenerative in nature  As time goes by, seborrheic keratoses tend to become more numerous  Some people inherit a tendency to develop a very large number of them; some people may have hundreds of them  The name \"seborrheic keratosis\" is misleading, because these lesions are not limited to a seborrhoeic distribution (scalp, mid-face, chest, upper back), nor are they formed from sebaceous glands, nor are they associated with sebum -- which is greasy  Seborrheic keratosis may also be called \"SK,\" \"Seb K,\" \"basal cell papilloma,\" \"senile wart,\" or \"barnacle  \"      Researchers have noted:  • Eruptive seborrhoeic keratoses can follow sunburn or dermatitis  • Skin friction may be the reason they appear in body folds  • Viral cause (e g , human papillomavirus) seems unlikely  • Stable and clonal mutations or activation of FRFR3, PIK3CA, DEANDRE, AKT1 and EGFR genes are found in seborrhoeic keratoses  • Seborrhoeic keratosis can arise from solar lentigo  • FRFR3 mutations also arise in solar lentigines  These mutations are associated with increased age and location on the head and neck, suggesting a role of ultraviolet radiation in these lesions  • Seborrheic keratoses do not harbour tumour suppressor gene mutations  • Epidermal growth factor receptor inhibitors, which are used to treat some cancers, often result in an increase in verrucal (warty) keratoses  There is no easy way to remove multiple lesions on a single occasion  Unless a specific lesion is \"inflamed\" and is causing pain or stinging/burning or is bleeding, most insurance companies do not authorize treatment          Scribe Attestation    I,:  Ton Faustin am acting as a scribe while in the presence of the attending physician :       I,:  Raul Allen MD personally performed the services described in this " documentation    as scribed in my presence :

## 2023-08-11 ENCOUNTER — TELEPHONE (OUTPATIENT)
Dept: DERMATOLOGY | Facility: CLINIC | Age: 48
End: 2023-08-11

## 2023-08-11 NOTE — TELEPHONE ENCOUNTER
Pt called in and said she is on spironolactone and has another rash. Pt is going to send a picture through her MyChart to Dr. Anjel Hernandez to look at.

## 2023-08-14 NOTE — TELEPHONE ENCOUNTER
Rash is located on trunk, stomach and armpit area and is much worse than what it was  after using the clindamycin topical.     Please advise on what she should do. Patient thinks it may be related to the spironolactone since it's happened while being on that medication. Tried to schedule a follow up for this week but no open slots available.

## 2023-08-15 NOTE — TELEPHONE ENCOUNTER
I called patient un able to reach her to schedule a follow up. Waiting for patient to confirm if date, time and location is okay to book on 9/06/2023.

## 2023-08-16 ENCOUNTER — TELEPHONE (OUTPATIENT)
Dept: DERMATOLOGY | Facility: CLINIC | Age: 48
End: 2023-08-16

## 2023-08-16 NOTE — TELEPHONE ENCOUNTER
Patient needs to been in [facility] next week.     (Patient has already been notified that she'll be called when schedule is available.)

## 2023-09-05 ENCOUNTER — NEW PATIENT COMPREHENSIVE (OUTPATIENT)
Dept: URBAN - METROPOLITAN AREA CLINIC 6 | Facility: CLINIC | Age: 48
End: 2023-09-05

## 2023-09-05 DIAGNOSIS — H43.393: ICD-10-CM

## 2023-09-05 PROCEDURE — 92004 COMPRE OPH EXAM NEW PT 1/>: CPT

## 2023-09-05 ASSESSMENT — TONOMETRY
OD_IOP_MMHG: 20
OS_IOP_MMHG: 18

## 2023-09-05 ASSESSMENT — VISUAL ACUITY
OD_SC: 20/20-2
OS_SC: 20/25+1

## 2023-09-06 ENCOUNTER — OFFICE VISIT (OUTPATIENT)
Dept: DERMATOLOGY | Facility: CLINIC | Age: 48
End: 2023-09-06
Payer: COMMERCIAL

## 2023-09-06 VITALS — BODY MASS INDEX: 25.73 KG/M2 | WEIGHT: 168 LBS

## 2023-09-06 DIAGNOSIS — D22.9 MULTIPLE MELANOCYTIC NEVI: ICD-10-CM

## 2023-09-06 DIAGNOSIS — L81.4 LENTIGO: ICD-10-CM

## 2023-09-06 DIAGNOSIS — L71.9 ROSACEA: ICD-10-CM

## 2023-09-06 DIAGNOSIS — L70.0 ACNE VULGARIS: ICD-10-CM

## 2023-09-06 DIAGNOSIS — L84 CALLUS OF FOOT: ICD-10-CM

## 2023-09-06 DIAGNOSIS — Z79.899 HIGH RISK MEDICATION USE: ICD-10-CM

## 2023-09-06 DIAGNOSIS — L73.9 FOLLICULITIS: Primary | ICD-10-CM

## 2023-09-06 PROCEDURE — 99214 OFFICE O/P EST MOD 30 MIN: CPT | Performed by: DERMATOLOGY

## 2023-09-06 RX ORDER — AZELAIC ACID 0.15 G/G
AEROSOL, FOAM TOPICAL
Qty: 50 G | Refills: 5 | Status: SHIPPED | OUTPATIENT
Start: 2023-09-06

## 2023-09-06 RX ORDER — SPIRONOLACTONE 100 MG/1
100 TABLET, FILM COATED ORAL DAILY
Qty: 30 TABLET | Refills: 3 | Status: SHIPPED | OUTPATIENT
Start: 2023-09-06

## 2023-09-06 RX ORDER — CLINDAMYCIN PHOSPHATE 10 MG/ML
1 SOLUTION TOPICAL 2 TIMES DAILY
Qty: 30 PAD | Refills: 5 | Status: SHIPPED | OUTPATIENT
Start: 2023-09-06

## 2023-09-06 RX ORDER — CLINDAMYCIN PHOSPHATE 10 UG/ML
LOTION TOPICAL 2 TIMES DAILY
Qty: 60 ML | Refills: 5 | Status: SHIPPED | OUTPATIENT
Start: 2023-09-06

## 2023-09-06 NOTE — PROGRESS NOTES
[facility] Note     Patient Name: George Sanz  Encounter Date: 09/06/23    Have you been cared for by a [facility] in the last 3 years and, if so, which description applies to you? Yes. I have been here within the last 3 years, and my medical history has NOT changed since that time. I am FEMALE/of child-bearing potential.    REVIEW OF SYSTEMS:  Have you recently had or currently have any of the following? No changes in my recent health. PAST MEDICAL HISTORY:  Have you personally ever had or currently have any of the following? If "YES," then please provide more detail. No changes in my medical history. FAMILY HISTORY:  Any "first degree relatives" (parent, brother, sister, or child) with the following? No changes in my family's known health. PATIENT EXPERIENCE:    Do you want the Dermatologist to perform a COMPLETE skin exam today including a clinical examination under the "bra and underwear" areas? NO  If necessary, do we have your permission to call and leave a detailed message on your Preferred Phone number that includes your specific medical information?   Yes      Allergies   Allergen Reactions   • Benzoyl Peroxide Rash     Other reaction(s): itching, swelling  Other reaction(s): itching, swelling        Current Outpatient Medications:   •  amphetamine-dextroamphetamine (ADDERALL XR) 10 MG 24 hr capsule, , Disp: , Rfl:   •  Azelaic Acid (Finacea) 15 % FOAM, Apply topically once or twice a day to a face area., Disp: 50 g, Rfl: 5  •  clindamycin (CLEOCIN T) 1 % lotion, Apply topically 2 (two) times a day Apply topically to affected areas twice a day., Disp: 60 mL, Rfl: 5  •  clindamycin (CLEOCIN T) 1 %, Apply 1 Pad topically 2 (two) times a day, Disp: 30 Pad, Rfl: 5  •  fluticasone (FLONASE) 50 mcg/act nasal spray, 1 spray into each nostril daily Using as needed, Disp: , Rfl:   •  Junel FE 1.5/30 1.5-30 MG-MCG tablet, ONE PILL DAILY, SKIP THE INACTIVE PILLS AND RESTART A NEW PACK IMMEDIATELY, Disp: , Rfl:   •  levothyroxine 50 mcg tablet, levothyroxine 50 mcg tablet, Disp: , Rfl:   •  Multiple Vitamin (MULTI-VITAMIN DAILY PO), Take by mouth, Disp: , Rfl:   •  spironolactone (ALDACTONE) 100 mg tablet, Take 1 tablet (100 mg total) by mouth daily, Disp: 30 tablet, Rfl: 3          Whom besides the patient is providing clinical information about today's encounter? NO ADDITIONAL HISTORIAN (patient alone provided history)    Physical Exam and Assessment/Plan by Diagnosis:            FOLLICULITIS    Physical Exam:  Anatomic Location Affected:  Buttock   Morphological Description:  Few follicular papule   Pertinent Positives:  Pertinent Negatives: Additional History of Present Condition:  C/O bumps on the buttock and trunk     Assessment and Plan:  Based on a thorough discussion of this condition and the management approach to it (including a comprehensive discussion of the known risks, side effects and potential benefits of treatment), the patient (family) agrees to implement the following specific plan:  Neutrogena acne wash     What is folliculitis? Folliculitis is the name given to a group of skin conditions in which there are inflamed hair follicles. The result is a tender red spot, often with a surface pustule. Folliculitis may be superficial or deep. It can affect anywhere there are hairs, including chest, back, buttocks, arms and legs. Acne and its variants are also types of folliculitis. What causes folliculitis? Folliculitis can be due to infection, occlusion (blockage), irritation and various skin diseases. Folliculitis due to infection  To determine if folliculitis is due to an infection, swabs should be taken from the pustules for cytology and culture in the laboratory. Bacteria  Bacterial folliculitis is commonly due to Staphylococcus aureus. If the infection involves the deep part of the follicle, it results in a painful boil.  Recommended treatment includes careful hygiene, antiseptic cleanser or cream, antibiotic ointment, and/or oral antibiotics. Spa pool folliculitis is due to infection with Pseudomonas aeruginosa, which thrives in inadequately chlorinated warm water. Gram negative folliculitis is a pustular facial eruption also due to infection with Pseudomonas aeruginosa or other similar organisms. When it appears, it usually follows tetracycline treatment of acne, but is quite rare. Yeasts  The most common yeast to cause a folliculitis is Pityrosporum ovale, also known as Malassezia. Malassezia folliculitis (Pityrosporum folliculitis) is an itchy acne-like condition usually affecting the upper trunk of a young adult. Treatment includes avoiding moisturisers, stopping any antibiotics and topical antifungal or oral antifungal medication for several weeks. Candida albicans can also provoke a folliculitis in skin folds (intertrigo) or in the beard area. It is treated with topical or oral antifungal agents. Fungi  Ringworm of the scalp (tinea capitis) usually results in scaling and hair loss, but sometimes results in folliculitis. In [facility], cat ringworm (Microsporum canis) is the commonest organism causing scalp fungal infection. Other fungi such as Trichophyton tonsurans are increasingly reported. Treatment is with oral antifungal agents for several months. Viral infections  Folliculitis may caused by herpes simplex virus. This tends to be tender, and resolves without treatment in around 10 days. Severe recurrent attacks may be treated with acyclovir and other antiviral agents. Herpes zoster (the cause of shingles) may also present as folliculitis with painful pustules and crusted spots within a dermatome (an area of skin supplied by a single nerve). It is treated with hihg-dose acyclovir. Molluscum contagiosum, common in young children, may also cause follicular umbilicated papules, usually clustered in and around a body fold. Molluscum may provoke dermatitis. Parasitic infection  Folliculitis on the face or scalp of older or immunosuppressed adults may be due to colonisation by hair follicle mites (demodex). This is known as demodicosis. The human infestation, scabies, often provokes folliculitis, as well as non-follicular papules, vesicles and pustules. Folliculitis due to irritation from regrowing hairs  Folliculitis may arise as hairs regrow after shaving, waxing, electrolysis or plucking. Swabs taken from the pustules are sterile ie there is no growth of bacteria or other organisms. In the beard area irritant folliculitis is known as pseudofolliculitis barbae. Irritant folliculitis is also common on the lower legs of women (shaving rash). It is frequently very itchy. Treatment is by stopping hair removal, and not beginning again for about three months after the folliculitis has settled. To prevent reoccurring irritant folliculitis, use a gentle hair removal method, such as a lady's electric razor. Avoid soap and apply plenty of shaving gel, if using a blade shaver. Folliculitis due to contact reactions  Occlusion  Paraffin-based ointments, moisturisers, and adhesive plasters may all result in a sterile folliculitis. If a moisturiser is needed, choose an oil-free product, as it is less likely to cause occlusion. Chemicals  Coal tar, cutting oils and other chemicals may cause an irritant folliculitis. Avoid contact with the causative product. Topical steroids  Overuse of topical steroids may produce a folliculitis. Perioral dermatitis is a facial folliculitis provoked by moisturisers and topical steroids. Perioral dermatitis is treated with tetracycline antibiotics for six weeks or so.     Folliculitis due to immunosuppression  Eosinophilic folliculitis is a specific type of folliculitis that may arise in some immune suppressed individuals such as those infected by human immunodeficiency virus (HIV) or those who have cancer. Folliculitis due to drugs  Folliculitis may be due to drugs, particularly corticosteroids (steroid acne), androgens (male hormones), ACTH, lithium, isoniazid (INH), phenytoin and B-complex vitamins. Protein kinase inhibitors (epidermal growth factor receptor inhibitors) and targeted therapy for metastatic melanoma (vemurafenib, dabrafenib) nearly always result in folliculitis. Folliculitis due to inflammatory skin diseases  Certain uncommon inflammatory skin diseases may cause permanent hair loss and scarring because of deep seated sterile folliculitis. These include:  Lichen planus  Discoid lupus erythematosus  Folliculitis decalvans  Folliculitis keloidalis     Treatment depends on the underlying condition and its severity. A skin biopsy is often necessary to establish the diagnosis. Acne variants   Acne and acne-like or acneform disorders are also forms of folliculitis. These include:  Acne vulgaris  Nodulocystic acne  Rosacea  Scalp folliculitis  Chloracne    The follicular occlusion syndrome refers to:  Hidradenitis suppurativa (acne inversa)  Acne conglobata (a severe form of nodulocystic acne)  Dissecting cellulitis (perifolliculitis capitis abscedens et suffodiens)  Pilonidal sinus. Treatment of the acne variants may include topical therapy as well as long courses of tetracycline antibiotics, isotretinoin (vitamin-A derivative) and in women, antiandrogenic therapy. Buttock folliculitis  Folliculitis affecting the buttocks is quite common and is often nonspecific, ie no specific cause is found. Buttock folliculitis is equally common in males and females. Acute buttock folliculitis is usually bacterial in origin (like boils), resulting in red painful papules and pustules. It clears with antibiotics. Chronic buttock folliculitis does not often cause significant symptoms but it can be very persistent.  Although antiseptics, topical acne treatments, peeling agents such as alphahydroxy acids, long courses of oral antibiotics and isotretinoin can help buttock folliculitis, they are not always effective. Hair removal might be worth trying if the affected area is hairy. As regrowth of hair can make it worse, permanent hair reduction by laser or intense pulsed light (IPL) is best.    ROSACEA    Physical Exam:  Anatomic Location Affected:  face  Morphological Description:  Inflammatory papules  Pertinent Positives:  Pertinent Negatives: Additional History of Present Condition:  Patient is currently using Spironolactone 50 mg tablets daily, Clindamycin Lotion twice daily, and Finacea 15% foam. Patient reports she has not been using the [address] foam consistently. Assessment and Plan:  Based on a thorough discussion of this condition and the management approach to it (including a comprehensive discussion of the known risks, side effects and potential benefits of treatment), the patient (family) agrees to implement the following specific plan:  1. Apply Clindamycin lotion to entire affected area twice daily   2. Clindamycin Pads as needed    3. Follow with an oil-free sunscreen (SPF 30 or higher). Some options include Neutrogena oil-free moisturizer with SPF   4. Increase spironolactone 100mg once daily. 5. CBC lab order, advised to wait one month before getting drawn     -Continue  spironolactone; discussed risks, benefits, side effects including breast tenderness, breast enlargement, spotting, diuresis, dizziness   -Limit bananas and avocados to one daily. Avoid coconut water while on this medicine     Rosacea is a chronic rash affecting the mid-face including the nose, cheeks, chin, forehead, and eyelids. The incidence is usually greatest between the ages of 30-60 years and is more common in people with fair skin. Common characteristics include redness, telangiectasias, papules and pustules over affected areas. Rosacea may look similar to acne, but there is a lack of comedones.  Occasionally the eyes may also be involved in ocular rosacea. In advanced disease, enlargement of the sebaceous glands in the nose, termed rhinophyma, may be present. Rosacea results in red spots (papules) and sometimes pustules over the face, but unlike acne there are no blackheads, whiteheads, or cystic nodules. Patients often experience increased facial flushing with prominent blood vessels (erythematotelangiectatic rosacea) and dry, sensitive skin. These symptoms are exacerbated by sun exposure, hot or spicy foods, topical steroids and oil-based facial products. In ocular rosacea, eyelids may be red and sore due to conjunctivitis, keratitis, and episcleritis. If rhinophyma develops due to enlargement of sebaceous glands, the patient may have an enlarged and irregularly shaped nose with prominent pores. In rosacea that is refractory to treatment, patients can develop persistent redness and swelling of the face due to lymphatic obstruction (Morbihan disease). Distribution around the cheeks may be confused with the malar or “butterfly rash” of lupus. However, the rash of lupus spares the nasal creases and lacks papules and pustules. If signs of photosensitivity, oral ulcers, arthritis, and kidney dysfunction are present then consider referral to a rheumatologist.     There are many potential causes of rosacea including genetic, environmental, vascular, and inflammatory factors.  These include, but are not limited to:  Chronic exposure to ultraviolet radiation   Increased immune responses in the form of cathelicidins that promote vessel dilation and infiltration with white blood cells (neutrophils) into the dermis  Increased matrix metalloproteinases such as collagen and elastase that remodel normal tissue may contribute to inflammation of the skin making it thicker and harder  There is some evidence to suggest that increased numbers of demodex mites on patient skin may contribute to rosacea papules     General Treatment Approach   Avoid exacerbating factors such as heat, spicy foods, and alcohol   Use daily SPF30+ sunscreen and other methods of coverage for sun protection  Use water-based make-up   Avoid applying topical steroids to affected areas as they can cause perioral dermatitis and exacerbate rosacea     Topical Treatment Approach  Metronidazole cream or gel by itself or in combination with oral antibiotics for more severe cases  Azelaic acid cream or lotion is effective for mild inflammatory rosacea when applied twice daily to affected areas  Brimonidine gel and oxymetazoline hydrochloride cream can reduce facial redness temporarily   Ivermectin cream can treat papulopustular rosacea by controlling demodex mites and inflammation   Pimecrolimus cream or tacrolimus ointment twice a day for 2-3 months can help reduce inflammation    Oral Treatment Approach  Antibiotics such as doxycycline, minocycline, or erythromycin for 1-3 months  Clonidine and carvedilol can help reduce facial flushing and are generally well tolerated. Common side effects include low blood pressure, gastrointestinal upset, dry eyes, blurred vision and low heart rate. Isotretinoin at low doses can be effective for long term treatment when antibiotics fail. Side effects may make it unsuitable for some patients. NSAIDs such as diclofenac can help reduce discomfort and redness in the skin.      Procedural/Surgical Treatment Approach   Vascular lasers or intense pulsed light treatment may be used to treat persistent telangiectasia and papulopustular rosacea  Plastic surgery and carbon dioxide lasers may be used to treat rhinophyma       MELANOCYTIC NEVI ("Moles")    Physical Exam:  Anatomic Location Affected:   Mostly on sun-exposed areas of the trunk and extremities  Morphological Description:  Scattered, 1-4mm round to ovoid, symmetrical-appearing, even bordered, skin colored to dark brown macules/papules, mostly in sun-exposed areas  Pertinent Positives:  Pertinent Negatives: Additional History of Present Condition:      Assessment and Plan:  Based on a thorough discussion of this condition and the management approach to it (including a comprehensive discussion of the known risks, side effects and potential benefits of treatment), the patient (family) agrees to implement the following specific plan:  When outside we recommend using a wide brim hat, sunglasses, long sleeve and pants, sunscreen with SPF 41+ with reapplication every 2 hours, or SPF specific clothing   Benign, reassured  Annual skin check     Melanocytic Nevi  Melanocytic nevi ("moles") are tan or brown, raised or flat areas of the skin which have an increased number of melanocytes. Melanocytes are the cells in our body which make pigment and account for skin color. Some moles are present at birth (I.e., "congenital nevi"), while others come up later in life (i.e., "acquired nevi"). The sun can stimulate the body to make more moles. Sunburns are not the only thing that triggers more moles. Chronic sun exposure can do it too. Clinically distinguishing a healthy mole from melanoma may be difficult, even for experienced dermatologists. The "ABCDE's" of moles have been suggested as a means of helping to alert a person to a suspicious mole and the possible increased risk of melanoma. The suggestions for raising alert are as follows:    Asymmetry: Healthy moles tend to be symmetric, while melanomas are often asymmetric. Asymmetry means if you draw a line through the mole, the two halves do not match in color, size, shape, or surface texture. Asymmetry can be a result of rapid enlargement of a mole, the development of a raised area on a previously flat lesion, scaling, ulceration, bleeding or scabbing within the mole.   Any mole that starts to demonstrate "asymmetry" should be examined promptly by a board certified dermatologist.     Border: Healthy moles tend to have discrete, even borders. The border of a melanoma often blends into the normal skin and does not sharply delineate the mole from normal skin. Any mole that starts to demonstrate "uneven borders" should be examined promptly by a board certified dermatologist.     Color: Healthy moles tend to be one color throughout. Melanomas tend to be made up of different colors ranging from dark black, blue, white, or red. Any mole that demonstrates a color change should be examined promptly by a board certified dermatologist.     Diameter: Healthy moles tend to be smaller than 0.6 cm in size; an exception are "congenital nevi" that can be larger. Melanomas tend to grow and can often be greater than 0.6 cm (1/4 of an inch, or the size of a pencil eraser). This is only a guideline, and many normal moles may be larger than 0.6 cm without being unhealthy. Any mole that starts to change in size (small to bigger or bigger to smaller) should be examined promptly by a board certified dermatologist.     Evolving: Healthy moles tend to "stay the same."  Melanomas may often show signs of change or evolution such as a change in size, shape, color, or elevation. Any mole that starts to itch, bleed, crust, burn, hurt, or ulcerate or demonstrate a change or evolution should be examined promptly by a board certified dermatologist.        LENTIGO    Physical Exam:  Anatomic Location Affected:  trunk, arms  Morphological Description:  Light brown macules  Pertinent Positives:  Pertinent Negatives:     Additional History of Present Condition:      Assessment and Plan:  Based on a thorough discussion of this condition and the management approach to it (including a comprehensive discussion of the known risks, side effects and potential benefits of treatment), the patient (family) agrees to implement the following specific plan:  When outside we recommend using a wide brim hat, sunglasses, long sleeve and pants, sunscreen with SPF 04+ with reapplication every 2 hours, or SPF specific clothing       What is a lentigo? A lentigo is a pigmented flat or slightly raised lesion with a clearly defined edge. Unlike an ephelis (freckle), it does not fade in the winter months. There are several kinds of lentigo. The name lentigo originally referred to its appearance resembling a small lentil. The plural of lentigo is lentigines, although “lentigos” is also in common use. Who gets lentigines? Lentigines can affect males and females of all ages and races. Solar lentigines are especially prevalent in fair skinned adults. Lentigines associated with syndromes are present at birth or arise during childhood. What causes lentigines? Common forms of lentigo are due to exposure to ultraviolet radiation:  Sun damage including sunburn   Indoor tanning   Phototherapy, especially photochemotherapy (PUVA)    Ionizing radiation, eg radiation therapy, can also cause lentigines. Several familial syndromes associated with widespread lentigines originate from mutations in Marc-MAP kinase, mTOR signaling and PTEN pathways. What is the treatment for lentigines? Most lentigines are left alone. Attempts to lighten them may not be successful. The following approaches are used:  SPF 50+ broad-spectrum sunscreen   Hydroquinone bleaching cream   Alpha hydroxy acids   Vitamin C   Retinoids   Azelaic acid   Chemical peels  Individual lesions can be permanently removed using:  Cryotherapy   Intense pulsed light   Pigment lasers    How can lentigines be prevented? Lentigines associated with exposure ultraviolet radiation can be prevented by very careful sun protection. Clothing is more successful at preventing new lentigines than are sunscreens. What is the outlook for lentigines? Lentigines usually persist. They may increase in number with age and sun exposure. Some in sun-protected sites may fade and disappear.       SEBORRHEIC KERATOSIS; NON-INFLAMED    Physical Exam:  Anatomic Location Affected:  trunk  Morphological Description:  Flat and raised, waxy, smooth to warty textured, yellow to brownish-grey to dark brown to blackish, discrete, "stuck-on" appearing papules. Pertinent Positives:  Pertinent Negatives: Additional History of Present Condition:      Assessment and Plan:  Based on a thorough discussion of this condition and the management approach to it (including a comprehensive discussion of the known risks, side effects and potential benefits of treatment), the patient (family) agrees to implement the following specific plan:  Monitor for changes  Benign, reassured      Seborrheic Keratosis  A seborrheic keratosis is a harmless warty spot that appears during adult life as a common sign of skin aging. Seborrheic keratoses can arise on any area of skin, covered or uncovered, with the usual exception of the palms and soles. They do not arise from mucous membranes. Seborrheic keratoses can have highly variable appearance. Seborrheic keratoses are extremely common. It has been estimated that over 90% of adults over the age of 61 years have one or more of them. They occur in males and females of all races, typically beginning to erupt in the 35s or 45s. They are uncommon under the age of 21 years. The precise cause of seborrhoeic keratoses is not known. Seborrhoeic keratoses are considered degenerative in nature. As time goes by, seborrheic keratoses tend to become more numerous. Some people inherit a tendency to develop a very large number of them; some people may have hundreds of them. There is no easy way to remove multiple lesions on a single occasion. Unless a specific lesion is "inflamed" and is causing pain or stinging/burning or is bleeding, most insurance companies do not authorize treatment. Callus on the right plantar, area was pared with 15 blade during visit. Advised patient to used a foot smoothing cream, wear more sneaker than flip flop.      Scribe Attestation I,:  Ovidio Schuster am acting as a scribe while in the presence of the attending physician.:       I,:  Raúl Reynolds MD personally performed the services described in this documentation    as scribed in my presence.:

## 2023-09-06 NOTE — PATIENT INSTRUCTIONS
Assessment and Plan:  Based on a thorough discussion of this condition and the management approach to it (including a comprehensive discussion of the known risks, side effects and potential benefits of treatment), the patient (family) agrees to implement the following specific plan:  Neutrogena acne wash   1. Apply Clindamycin lotion to entire affected area twice daily   2. Clindamycin Pads as needed    3. Follow with an oil-free sunscreen (SPF 30 or higher). Some options include Neutrogena oil-free moisturizer with SPF   4. Increase spironolactone 100mg once daily. 5. CBC lab order, advised to wait one month before getting drawn       Callus on the right plantar, area was pared with 15 blade during visit. Advised patient to used a foot smoothing cream, wear more sneaker than flip flop.

## 2023-09-14 ENCOUNTER — TELEPHONE (OUTPATIENT)
Age: 48
End: 2023-09-14

## 2023-09-14 NOTE — TELEPHONE ENCOUNTER
Pt seen on 9/6 for rash, calling to state that it is spreading and wanted to inform provider of such and ask if possible for apt for re-evaluation (nothing available); informed pt to send pictures to Dr Donna Palacios through [facility] so he can evaluate. Pt verbally agreed and will send.

## 2023-09-30 DIAGNOSIS — L70.0 ACNE VULGARIS: ICD-10-CM

## 2023-09-30 DIAGNOSIS — Z79.899 HIGH RISK MEDICATION USE: ICD-10-CM

## 2023-09-30 DIAGNOSIS — L73.9 FOLLICULITIS: ICD-10-CM

## 2023-09-30 DIAGNOSIS — L71.9 ROSACEA: ICD-10-CM

## 2023-10-02 RX ORDER — SPIRONOLACTONE 100 MG/1
100 TABLET, FILM COATED ORAL DAILY
Qty: 90 TABLET | Refills: 1 | Status: SHIPPED | OUTPATIENT
Start: 2023-10-02

## 2023-10-23 PROBLEM — S03.40XD: Status: ACTIVE | Noted: 2023-10-23

## 2023-10-29 PROBLEM — R42 DIZZINESS: Status: ACTIVE | Noted: 2023-10-29

## 2024-01-23 ENCOUNTER — OFFICE VISIT (OUTPATIENT)
Dept: DERMATOLOGY | Facility: CLINIC | Age: 49
End: 2024-01-23
Payer: COMMERCIAL

## 2024-01-23 DIAGNOSIS — L70.0 ACNE VULGARIS: ICD-10-CM

## 2024-01-23 DIAGNOSIS — L71.9 ROSACEA: ICD-10-CM

## 2024-01-23 DIAGNOSIS — L73.9 FOLLICULITIS: Primary | ICD-10-CM

## 2024-01-23 PROCEDURE — 99214 OFFICE O/P EST MOD 30 MIN: CPT | Performed by: DERMATOLOGY

## 2024-01-23 RX ORDER — AZELAIC ACID 0.15 G/G
1 GEL TOPICAL DAILY
Qty: 30 G | Refills: 3 | Status: SHIPPED | OUTPATIENT
Start: 2024-01-23

## 2024-01-23 RX ORDER — AZELAIC ACID 0.15 G/G
AEROSOL, FOAM TOPICAL
Qty: 50 G | Refills: 5 | Status: SHIPPED | OUTPATIENT
Start: 2024-01-23

## 2024-02-26 ENCOUNTER — COSMETIC (OUTPATIENT)
Dept: DERMATOLOGY | Facility: CLINIC | Age: 49
End: 2024-02-26

## 2024-02-26 DIAGNOSIS — L71.9 ROSACEA: ICD-10-CM

## 2024-02-26 DIAGNOSIS — L81.4 LENTIGINES: ICD-10-CM

## 2024-02-26 DIAGNOSIS — L81.6 POIKILODERMA: ICD-10-CM

## 2024-02-26 DIAGNOSIS — L98.8 RHYTIDES: Primary | ICD-10-CM

## 2024-02-26 PROCEDURE — COSCON COSMETIC CONSULTATION: Performed by: STUDENT IN AN ORGANIZED HEALTH CARE EDUCATION/TRAINING PROGRAM

## 2024-02-26 RX ORDER — ALBUTEROL SULFATE 90 UG/1
2 AEROSOL, METERED RESPIRATORY (INHALATION) EVERY 6 HOURS PRN
COMMUNITY
Start: 2023-11-13

## 2024-02-26 NOTE — PROGRESS NOTES
"[facility] Dermatology Clinic Note     Patient Name: Janie Kaur  Encounter Date: 02/26/2024     Have you been cared for by a [facility] Dermatologist in the last 3 years and, if so, which description applies to you?    Yes.  I have been here within the last 3 years, and my medical history has NOT changed since that time.  I am FEMALE/of child-bearing potential.    REVIEW OF SYSTEMS:  Have you recently had or currently have any of the following? No changes in my recent health.   PAST MEDICAL HISTORY:  Have you personally ever had or currently have any of the following?  If \"YES,\" then please provide more detail. No changes in my medical history.   HISTORY OF IMMUNOSUPPRESSION: Do you have a history of any of the following:  Systemic Immunosuppression such as Diabetes, Biologic or Immunotherapy, Chemotherapy, Organ Transplantation, Bone Marrow Transplantation?  No     Answering \"YES\" requires the addition of the dotphrase \"IMMUNOSUPPRESSED\" as the first diagnosis of the patient's visit.   FAMILY HISTORY:  Any \"first degree relatives\" (parent, brother, sister, or child) with the following?    No changes in my family's known health.   PATIENT EXPERIENCE:    Do you want the Dermatologist to perform a COMPLETE skin exam today including a clinical examination under the \"bra and underwear\" areas?  NO  If necessary, do we have your permission to call and leave a detailed message on your Preferred Phone number that includes your specific medical information?  Yes      Allergies   Allergen Reactions    Gluten Meal - Food Allergy GI Intolerance     Other Reaction(s): bowel issues    Benzoyl Peroxide Rash     Other reaction(s): itching, swelling  Other reaction(s): itching, swelling        Current Outpatient Medications:     Azelaic Acid (Finacea) 15 % FOAM, Apply topically once or twice a day to a face area., Disp: 50 g, Rfl: 5    Azelaic Acid 15 % cream, Apply 1 Application topically daily SM5 Azelaic Acid 15%/Niacinamide " 2% cream, Disp: 30 g, Rfl: 3    amphetamine-dextroamphetamine (ADDERALL XR) 10 MG 24 hr capsule, , Disp: , Rfl:     clindamycin (CLEOCIN T) 1 % lotion, Apply topically 2 (two) times a day Apply topically to affected areas twice a day. (Patient not taking: Reported on 1/23/2024), Disp: 60 mL, Rfl: 5    clindamycin (CLEOCIN T) 1 %, Apply 1 Pad topically 2 (two) times a day, Disp: 30 Pad, Rfl: 5    fluticasone (FLONASE) 50 mcg/act nasal spray, 1 spray into each nostril daily Using as needed, Disp: , Rfl:     Junel FE 1.5/30 1.5-30 MG-MCG tablet, ONE PILL DAILY, SKIP THE INACTIVE PILLS AND RESTART A NEW PACK IMMEDIATELY, Disp: , Rfl:     levothyroxine 50 mcg tablet, levothyroxine 50 mcg tablet, Disp: , Rfl:     Multiple Vitamin (MULTI-VITAMIN DAILY PO), Take by mouth (Patient not taking: Reported on 1/23/2024), Disp: , Rfl:     spironolactone (ALDACTONE) 100 mg tablet, TAKE 1 TABLET BY MOUTH EVERY DAY, Disp: 90 tablet, Rfl: 1          Whom besides the patient is providing clinical information about today's encounter?   NO ADDITIONAL HISTORIAN (patient alone provided history)    Physical Exam and Assessment/Plan by Diagnosis:    COSMETIC CONSULT  RHYTIDES, REDNESS, POIKLODERMA  Physical Exam:  Anatomic Location Affected & Morphological Description:    Scattered tan macules on b/l cheeks bones with hyperpigmented and erythematous patches on the mid chest, sideburn/lateral neck, static lines on   Pertinent Positives:  Pertinent Negatives:    Additional History of Present Condition:  patient presents for cosmetic treatment of wrinkles, redness, and laxity (texture of skin)    Assessment and Plan:  Based on a thorough discussion of this condition and the management approach to it (including a comprehensive discussion of the known risks, side effects and potential benefits of treatment), the patient (family) agrees to implement the following specific plan:    - For fine lines   -Discussed ablative CO2, HALO (will be available  in July at our [facility]) vs non ablative ResurFX lasers for collagen renewal.   -Discussed slow results over time- typically 3 months to see final result after one tx. Would recommend series of 3 treatments.  -Discussed Botox injections for the forehead- $15 x # of units; usually use about 20-30 units   -Patient agreeable to doing Botox today in office-consent obtained (See procedure not below)  -Discussed Frownies patches for forehead to use in between botox treatments to prolong effect.    -CO2 would require preoperative Valtrex and BLT topical numbing for 1 hour prior to procedure. Downtime of at least 1 week with daily dilute vinegar soaks during recovery.  - ResurFX and HALO would require BLT topical numbing 1 hour prior prior and around 1 week downtime with redness, swelling.   -For CO2, periorally, quote:   1 treatment $1,200.00 for one treatment, 3 treatments: $2,880.00  Can schedule ONLY at [place]   -For ResurFx, half-face, quote:   1 treatment $375, 3 treatments: $1000;    $750 (full face); $2100 for 3 treatments.  Can schedule at either  or [place] location  - Do not have HALO pricing yet    -Start using Adapalene OTC gel- At night after moisturizing, apply a pea-sized amount of tretinoin/lightening cream  - an even thin layer on your face. Can be drying.   Start by using once weekly for one week (Monday), then increase to twice weekly (Mon, Fri) the next week and then three times weekly on week three (Mon, Wed, Friday).     Avoid the eye area and the curves around your nose and corners of your lips.     If you find you are still a bit dry doing the above, try sandwiching the tretinoin between two layers of moisturizer.     If too irritating, take a night or two off. For example, if you find you are doing well using 2x weekly and get dry and irritated with 3x weekly, go back to using 2x weekly for a week or two and then re-trail increasing how often you use it.        LENTIGINES (SUN SPOTS)  COSMETIC CONSULT    Physical Exam:  Anatomic Location Affected & Morphological Description:  - a few light tan macules scattered on face and chest and mid chest and lateral neck with scattered tan macules and erythema (mild poikiloderma)      Additional History of Present Condition:      Assessment and Plan:  Based on a thorough discussion of this condition and the management approach to it (including a comprehensive discussion of the known risks, side effects and potential benefits of treatment), the patient (family) agrees to implement the following specific plan:    Discussed overall treatment with IPL or BBL (Treats brown spots, blood vessels and sun damage) or Moxi treatment (treats browns)  Would recommend series of 3 IPL or BBL to  help with pink and brown pigment (from lentigos and poikilodermatous change on chest)  IPL photofacial: $300 one treatment; $800 for 3 treatments   Do not have cost for BBL or Moxi yet, this will be available at medical spa    -Start using Adapalene OTC gel- At night after moisturizing, apply a pea-sized amount of tretinoin/lightening cream  - an even thin layer on your face. Can be drying.   Start by using once weekly for one week (Monday), then increase to twice weekly (Mon, Fri) the next week and then three times weekly on week three (Mon, Wed, Friday).     Avoid the eye area and the curves around your nose and corners of your lips.     If you find you are still a bit dry doing the above, try sandwiching the tretinoin between two layers of moisturizer.     If too irritating, take a night or two off. For example, if you find you are doing well using 2x weekly and get dry and irritated with 3x weekly, go back to using 2x weekly for a week or two and then re-trail increasing how often you use it.     Discussed need for liberal use of sun protection.  Apply SPF 30 or higher at least three times a day.  Wear sun protecting clothing and hats.       Botox Procedure Note    Screening  Questions   Are you pregnant or breastfeeding? no  Do you take aspirin, fish oil or any other blood thinning medicines? Fish oil  Have you had an allergic reaction to any injectables before? unknown  Have you had any surgeries on your face? Yes, rhinoplasty   Have you been diagnosed with a muscle or nerve condition like myasthenia gravis, ALS or Lambert-Eaton syndrome? no    Diagnosis: rhytides    [facility]: [facility]    Informed consent: Discussed risks (infection, pain, bleeding, bruising, swelling, allergic reaction, paralysis of nearby muscles, eyelid droop, double vision, neck weakness, difficulty breathing, headache, undesirable cosmetic result, need for additional treatment, and death) and benefits of the procedure, as well as the alternatives. Informed consent was obtained.     Preparation: The area was cleansed with alcohol.    Procedure Details: Botox was injected into the dermis with a 30-gauge needle. Pressure applied to any bleeding.   Lot Number: [id number]  Expiration: 2026/02  Total Units Injected: 7     Plan: Patient instructed to remain upright for 6 hours. Tylenol may be used for headache. Allow 2 weeks before returning to clinic for additional dosing as needed. Call for any problems. Written instructions provided.     Tolerance: well-tolerated; no issues  Complications: none  Other procedures: none  Photography: yes    THIS IS A COSMETIC PATIENT WHO PAID OUT OF POCKET AT TIME OF VISIT. DO NOT BILL. PATIENT PAID $150.00     TOTAL UNITS USED: 7  TOTAL COST: 150.00 for consultation, cost of $105 for botox is covered by cost of consultation    Normal cost: $15 x # of units          Scribe Attestation      I,:  Janie Deleon am acting as a scribe while in the presence of the attending physician.:       I,:  Barbi Devi MD personally performed the services described in this documentation    as scribed in my presence.:

## 2024-02-26 NOTE — PATIENT INSTRUCTIONS
COSMETIC CONSULT  RHYTIDES, REDNESS, LAXITY, POIKLODERMA  Assessment and Plan:  Based on a thorough discussion of this condition and the management approach to it (including a comprehensive discussion of the known risks, side effects and potential benefits of treatment), the patient (family) agrees to implement the following specific plan:    - For fine lines   -Discussed ablative CO2 vs non ablative ResurFX lasers for collagen renewal.   -Discussed slow results over time- typically 3 months to see final result after one tx. Would recommend series of 3 treatments.  -Discussed Botox injections for the forehead- $15 x # of units; usually use about 20-30 units   -Patient agreeable to doing Botox today in office-consent obtained (See procedure not below)  -Discussed Frownies patches for forehead   -Discussed Halo- will be available in July at our [facility]  -CO2 would require preoperative Valtrex and BLT topical numbing for 1 hour prior to procedure. Downtime of at least 1 week with daily dilute vinegar soaks during recovery.  - ResurFX would require BLT topical numbing 1 hour prior prior and around 1 week downtime with redness, swelling.   -For CO2, periorally, quote:   1 treatment $1,200.00 for one treatment, 3 treatments: $2,880.00  Can schedule ONLY at [place]   -For ResurFx, half-face, quote:   1 treatment $375, 3 treatments: $1000;    $750 (full face); $2100 for 3 treatments.  Can schedule at either  or [place] location    -Start using Adapalene OTC gel- At night after moisturizing, apply a pea-sized amount of tretinoin/lightening cream  - an even thin layer on your face. Can be drying.   Start by using once weekly for one week (Monday), then increase to twice weekly (Mon, Fri) the next week and then three times weekly on week three (Mon, Wed, Friday).     Avoid the eye area and the curves around your nose and corners of your lips.     If you find you are still a bit dry doing the above, try sandwiching the  tretinoin between two layers of moisturizer.     If too irritating, take a night or two off. For example, if you find you are doing well using 2x weekly and get dry and irritated with 3x weekly, go back to using 2x weekly for a week or two and then re-trail increasing how often you use it.       - For redness, increased vessels:   -Discussed PDL laser which focuses in on the redness. Discussed temporary redness and swelling can take 5-7 days to dissipate.    -Quoted following cost 1 treatment 300.00; package of  3 treatments 650.00   -Can schedule at either  or [facility]       LENTIGINES (SUN SPOTS) COSMETIC CONSULT    Assessment and Plan:  Based on a thorough discussion of this condition and the management approach to it (including a comprehensive discussion of the known risks, side effects and potential benefits of treatment), the patient (family) agrees to implement the following specific plan:  For discrete lesions:  Given discrete nature of lesions, advised treatment with [id number]-NdYAG to focal lesions  Quote: $250 for up to 10 lesions (HYFRLES)  For more extensive involvement:  Discussed Skin Medica chemical peel as non specific but will help lighten lesions. Would recommend series of 3 treatments. Would expect peeling over 5-7 days after treatment and would recommend strict sun avoidance. Discussed avoiding other products while in treatment  Discussed overall treatment with IPL treatment (series of 3) will help with pink and brown pigment (from lentigos)  IPL photofacial: $300; $800 for 3 treatments   Treats brown spots, blood vessels and sun damage    -Start using Adapalene OTC gel- At night after moisturizing, apply a pea-sized amount of tretinoin/lightening cream  - an even thin layer on your face. Can be drying.   Start by using once weekly for one week (Monday), then increase to twice weekly (Mon, Fri) the next week and then three times weekly on week three (Mon, Wed, Friday).     Avoid the eye area  and the curves around your nose and corners of your lips.     If you find you are still a bit dry doing the above, try sandwiching the tretinoin between two layers of moisturizer.     If too irritating, take a night or two off. For example, if you find you are doing well using 2x weekly and get dry and irritated with 3x weekly, go back to using 2x weekly for a week or two and then re-trail increasing how often you use it.     Discussed need for liberal use of sun protection.  Apply SPF 30 or higher at least three times a day.  Wear sun protecting clothing and hats.

## 2024-04-13 ENCOUNTER — OFFICE VISIT (OUTPATIENT)
Dept: URGENT CARE | Facility: CLINIC | Age: 49
End: 2024-04-13
Payer: COMMERCIAL

## 2024-04-13 VITALS
DIASTOLIC BLOOD PRESSURE: 76 MMHG | SYSTOLIC BLOOD PRESSURE: 96 MMHG | RESPIRATION RATE: 20 BRPM | OXYGEN SATURATION: 99 % | HEART RATE: 78 BPM | TEMPERATURE: 97.7 F

## 2024-04-13 DIAGNOSIS — J02.9 ACUTE PHARYNGITIS, UNSPECIFIED ETIOLOGY: Primary | ICD-10-CM

## 2024-04-13 LAB — S PYO AG THROAT QL: NEGATIVE

## 2024-04-13 PROCEDURE — 87880 STREP A ASSAY W/OPTIC: CPT | Performed by: PHYSICIAN ASSISTANT

## 2024-04-13 PROCEDURE — 87070 CULTURE OTHR SPECIMN AEROBIC: CPT | Performed by: PHYSICIAN ASSISTANT

## 2024-04-13 PROCEDURE — 99213 OFFICE O/P EST LOW 20 MIN: CPT | Performed by: PHYSICIAN ASSISTANT

## 2024-04-13 NOTE — PATIENT INSTRUCTIONS
Follow-up with your primary care provider in the next 3-5 days.  Any new or worsening symptoms develop get re-evaluated sooner or proceed to the ER.  Throat culture results to be available in a few days.  Rapid strep swab negative.

## 2024-04-13 NOTE — PROGRESS NOTES
[facility]        NAME: Janie Kaur is a 48 y.o. female  : 1975    MRN: [id number]  DATE: 2024  TIME: 10:03 AM    Assessment and Plan   Acute pharyngitis, unspecified etiology [J02.9]  1. Acute pharyngitis, unspecified etiology  POCT rapid strepA    Throat culture            Patient Instructions       Follow up with PCP in 3-5 days.  Proceed to  ER if symptoms worsen.    If tests have been performed at [facility], our office will contact you with results if changes need to be made to the care plan discussed with you at the visit.  You can review your full results on [facility].    Chief Complaint     Chief Complaint   Patient presents with    Sore Throat     Patient has had a sore throat starting on Thursday night, her son has strep throat currently          History of Present Illness       Patient presents with postnasal drip and sore throat starting 2 days ago.  Patient concerned for strep as her son has it.  Denies congestion, runny nose, cough, fevers, shortness of breath.    Sore Throat   Pertinent negatives include no congestion, coughing, ear discharge, ear pain or shortness of breath.       Review of Systems   Review of Systems   Constitutional:  Negative for chills, fatigue and fever.   HENT:  Positive for postnasal drip and sore throat. Negative for congestion, ear discharge, ear pain, rhinorrhea, sinus pressure and sinus pain.    Respiratory:  Negative for cough, chest tightness, shortness of breath and wheezing.    Cardiovascular:  Negative for chest pain and palpitations.   Musculoskeletal:  Negative for arthralgias and myalgias.   Neurological:  Negative for weakness.   Psychiatric/Behavioral:  Negative for confusion.          Current Medications       Current Outpatient Medications:     albuterol (ProAir HFA) 90 mcg/act inhaler, Inhale 2 puffs every 6 (six) hours as needed, Disp: , Rfl:     amphetamine-dextroamphetamine (ADDERALL XR) 10 MG 24 hr capsule, , Disp:  , Rfl:     Azelaic Acid (Finacea) 15 % FOAM, Apply topically once or twice a day to a face area., Disp: 50 g, Rfl: 5    Azelaic Acid 15 % cream, Apply 1 Application topically daily SM5 Azelaic Acid 15%/Niacinamide 2% cream, Disp: 30 g, Rfl: 3    clindamycin (CLEOCIN T) 1 % lotion, Apply topically 2 (two) times a day Apply topically to affected areas twice a day. (Patient not taking: Reported on 1/23/2024), Disp: 60 mL, Rfl: 5    clindamycin (CLEOCIN T) 1 %, Apply 1 Pad topically 2 (two) times a day, Disp: 30 Pad, Rfl: 5    fluticasone (FLONASE) 50 mcg/act nasal spray, 1 spray into each nostril daily Using as needed, Disp: , Rfl:     Junel FE 1.5/30 1.5-30 MG-MCG tablet, ONE PILL DAILY, SKIP THE INACTIVE PILLS AND RESTART A NEW PACK IMMEDIATELY, Disp: , Rfl:     levothyroxine 50 mcg tablet, levothyroxine 50 mcg tablet, Disp: , Rfl:     Multiple Vitamin (MULTI-VITAMIN DAILY PO), Take by mouth (Patient not taking: Reported on 1/23/2024), Disp: , Rfl:     spironolactone (ALDACTONE) 100 mg tablet, TAKE 1 TABLET BY MOUTH EVERY DAY, Disp: 90 tablet, Rfl: 1    Current Allergies     Allergies as of 04/13/2024 - Reviewed 02/26/2024   Allergen Reaction Noted    Gluten meal - food allergy GI Intolerance 12/18/2023    Benzoyl peroxide Rash 05/24/2014            The following portions of the patient's history were reviewed and updated as appropriate: allergies, current medications, past family history, past medical history, past social history, past surgical history and problem list.     Past Medical History:   Diagnosis Date    Acne     ADHD     Generalized articular hypermobility     Hypothyroidism     Hashimoto's    IBS (irritable bowel syndrome)     Diarrhea    Lumbar spondylosis     Positive TAHIR (antinuclear antibody)     Seasonal allergies        Past Surgical History:   Procedure Laterality Date    RHINOPLASTY      with septoplasty - Dr. Iraheta    SKIN BIOPSY      TONSILLECTOMY         Family History   Problem Relation  Age of Onset    Basal cell carcinoma Mother     Hypertension Mother     Diabetes Mother     Arthritis Mother     Diabetes Father     Hypertension Father     Hypothyroidism Father     Heart disease Father     Basal cell carcinoma Maternal Grandfather     Thyroid disease unspecified Family     Arthritis Family          Medications have been verified.        Objective   BP 96/76   Pulse 78   Temp 97.7 °F (36.5 °C)   Resp 20   SpO2 99%   No LMP recorded.       Physical Exam     Physical Exam  Constitutional:       General: She is not in acute distress.     Appearance: Normal appearance. She is not ill-appearing or diaphoretic.   HENT:      Head: Normocephalic and atraumatic.      Right Ear: Tympanic membrane, ear canal and external ear normal.      Left Ear: Tympanic membrane, ear canal and external ear normal.      Nose: Nose normal.      Mouth/Throat:      Mouth: Mucous membranes are moist.      Pharynx: Oropharynx is clear. Posterior oropharyngeal erythema present.      Tonsils: No tonsillar exudate. 0 on the right. 0 on the left.   Eyes:      Conjunctiva/sclera: Conjunctivae normal.   Cardiovascular:      Rate and Rhythm: Normal rate and regular rhythm.      Heart sounds: Normal heart sounds.   Pulmonary:      Effort: Pulmonary effort is normal.      Breath sounds: Normal breath sounds.   Musculoskeletal:      Cervical back: No muscular tenderness.   Skin:     General: Skin is warm and dry.   Neurological:      Mental Status: She is alert.   Psychiatric:         Mood and Affect: Mood normal.         Behavior: Behavior normal.

## 2024-04-14 DIAGNOSIS — Z79.899 HIGH RISK MEDICATION USE: ICD-10-CM

## 2024-04-14 DIAGNOSIS — L71.9 ROSACEA: ICD-10-CM

## 2024-04-14 DIAGNOSIS — L70.0 ACNE VULGARIS: ICD-10-CM

## 2024-04-14 DIAGNOSIS — L73.9 FOLLICULITIS: ICD-10-CM

## 2024-04-15 RX ORDER — SPIRONOLACTONE 100 MG/1
100 TABLET, FILM COATED ORAL DAILY
Qty: 30 TABLET | Refills: 5 | Status: SHIPPED | OUTPATIENT
Start: 2024-04-15

## 2024-04-16 LAB — BACTERIA THROAT CULT: NORMAL

## 2024-04-22 ENCOUNTER — TELEPHONE (OUTPATIENT)
Age: 49
End: 2024-04-22

## 2024-06-19 DIAGNOSIS — Z00.6 ENCOUNTER FOR EXAMINATION FOR NORMAL COMPARISON OR CONTROL IN CLINICAL RESEARCH PROGRAM: ICD-10-CM

## 2024-07-18 ENCOUNTER — OFFICE VISIT (OUTPATIENT)
Dept: DERMATOLOGY | Facility: CLINIC | Age: 49
End: 2024-07-18
Payer: COMMERCIAL

## 2024-07-18 DIAGNOSIS — Z79.899 HIGH RISK MEDICATION USE: ICD-10-CM

## 2024-07-18 DIAGNOSIS — L73.9 FOLLICULITIS: Primary | ICD-10-CM

## 2024-07-18 DIAGNOSIS — L70.0 ACNE VULGARIS: ICD-10-CM

## 2024-07-18 DIAGNOSIS — L71.9 ROSACEA: ICD-10-CM

## 2024-07-18 PROCEDURE — 99214 OFFICE O/P EST MOD 30 MIN: CPT | Performed by: DERMATOLOGY

## 2024-07-18 RX ORDER — SPIRONOLACTONE 100 MG/1
100 TABLET, FILM COATED ORAL DAILY
Qty: 30 TABLET | Refills: 5 | Status: SHIPPED | OUTPATIENT
Start: 2024-07-18

## 2024-07-18 NOTE — PATIENT INSTRUCTIONS
SEBORRHEIC KERATOSES  - Relevant exam: Scattered over the trunk/extremities are waxy brown to black plaques and papules with stuck on appearance and consistent dermoscopy  - Exam and clinical history consistent with seborrheic keratoses  - Counseled that these are benign growths that do not require treatment    MELANOCYTIC NEVI  -Relevant exam: Scattered over the trunk/extremities are homogenously pigmented brown macules and papules. ELM performed and without concerning findings. No outliers unless otherwise noted in today's note  - Exam and clinical history consistent with melanocytic nevi  - Counseled to return to clinic prior to scheduled appointment should any of these lesions change or should any new lesions of concern arise  - Counseled on use of sun protection daily. Reviewed latest FDA sunscreen guidelines, including use of broad spectrum (UVA and UVB blocking) sunscreen or sun protective clothing with SPF 30-50 every 2-3 hours and reapplied after exposure to water    LENTIGINES  OTHER SKIN CHANGES DUE TO CHRONIC EXPOSURE TO NONIONIZING RADIATION  - Relevant exam: Over sun exposed areas are brown macules. ELM performed and without concerning findings.  - Exam and clinical history consistent with lentigines.  - Counseled to return to clinic prior to scheduled appointment should any of these lesions change or should any new lesions of concern arise.  - Recommended use of sunscreen as above and below.    CHERRY ANGIOMAS  - Relevant exam: Scattered over the trunk/extremities are red papules  - Exam and clinical history consistent with cherry angiomas  - Educated that these are benign    FOLLICULITIS    Based on a thorough discussion of this condition and the management approach to it (including a comprehensive discussion of the known risks, side effects and potential benefits of treatment), the patient (family) agrees to implement the following specific plan:  Continue Spironolactone 100 mg daily.     What is  folliculitis?  Folliculitis is the name given to a group of skin conditions in which there are inflamed hair follicles. The result is a tender red spot, often with a surface pustule.  Folliculitis may be superficial or deep. It can affect anywhere there are hairs, including chest, back, buttocks, arms and legs. Acne and its variants are also types of folliculitis.    What causes folliculitis?  Folliculitis can be due to infection, occlusion (blockage), irritation and various skin diseases.    Folliculitis due to infection  To determine if folliculitis is due to an infection, swabs should be taken from the pustules for cytology and culture in the laboratory.    Bacteria  Bacterial folliculitis is commonly due to Staphylococcus aureus. If the infection involves the deep part of the follicle, it results in a painful boil. Recommended treatment includes careful hygiene, antiseptic cleanser or cream, antibiotic ointment, and/or oral antibiotics.    Spa pool folliculitis is due to infection with Pseudomonas aeruginosa, which thrives in inadequately chlorinated warm water. Gram negative folliculitis is a pustular facial eruption also due to infection with Pseudomonas aeruginosa or other similar organisms. When it appears, it usually follows tetracycline treatment of acne, but is quite rare.    Yeasts  The most common yeast to cause a folliculitis is Pityrosporum ovale, also known as Malassezia. Malassezia folliculitis (Pityrosporum folliculitis) is an itchy acne-like condition usually affecting the upper trunk of a young adult. Treatment includes avoiding moisturisers, stopping any antibiotics and topical antifungal or oral antifungal medication for several weeks.    Candida albicans can also provoke a folliculitis in skin folds (intertrigo) or in the beard area. It is treated with topical or oral antifungal agents.    Fungi  Ringworm of the scalp (tinea capitis) usually results in scaling and hair loss, but sometimes  results in folliculitis. In [place], cat ringworm (Microsporum canis) is the commonest organism causing scalp fungal infection. Other fungi such as Trichophyton tonsurans are increasingly reported. Treatment is with oral antifungal agents for several months.    Viral infections  Folliculitis may caused by herpes simplex virus. This tends to be tender, and resolves without treatment in around 10 days. Severe recurrent attacks may be treated with acyclovir and other antiviral agents.    Herpes zoster (the cause of shingles) may also present as folliculitis with painful pustules and crusted spots within a dermatome (an area of skin supplied by a single nerve). It is treated with hihg-dose acyclovir.  Molluscum contagiosum, common in young children, may also cause follicular umbilicated papules, usually clustered in and around a body fold. Molluscum may provoke dermatitis.    Parasitic infection  Folliculitis on the face or scalp of older or immunosuppressed adults may be due to colonisation by hair follicle mites (demodex). This is known as demodicosis.  The human infestation, scabies, often provokes folliculitis, as well as non-follicular papules, vesicles and pustules.    Folliculitis due to irritation from regrowing hairs  Folliculitis may arise as hairs regrow after shaving, waxing, electrolysis or plucking. Swabs taken from the pustules are sterile ie there is no growth of bacteria or other organisms. In the beard area irritant folliculitis is known as pseudofolliculitis barbae.  Irritant folliculitis is also common on the lower legs of women (shaving rash). It is frequently very itchy. Treatment is by stopping hair removal, and not beginning again for about three months after the folliculitis has settled. To prevent reoccurring irritant folliculitis, use a gentle hair removal method, such as a lady's electric razor. Avoid soap and apply plenty of shaving gel, if using a blade shaver.    Folliculitis due to  contact reactions  Occlusion  Paraffin-based ointments, moisturisers, and adhesive plasters may all result in a sterile folliculitis. If a moisturiser is needed, choose an oil-free product, as it is less likely to cause occlusion.    Chemicals  Coal tar, cutting oils and other chemicals may cause an irritant folliculitis. Avoid contact with the causative product.    Topical steroids  Overuse of topical steroids may produce a folliculitis. Perioral dermatitis is a facial folliculitis provoked by moisturisers and topical steroids. Perioral dermatitis is treated with tetracycline antibiotics for six weeks or so.    Folliculitis due to immunosuppression  Eosinophilic folliculitis is a specific type of folliculitis that may arise in some immune suppressed individuals such as those infected by human immunodeficiency virus (HIV) or those who have cancer.    Folliculitis due to drugs  Folliculitis may be due to drugs, particularly corticosteroids (steroid acne), androgens (male hormones), ACTH, lithium, isoniazid (INH), phenytoin and B-complex vitamins. Protein kinase inhibitors (epidermal growth factor receptor inhibitors) and targeted therapy for metastatic melanoma (vemurafenib, dabrafenib) nearly always result in folliculitis.    Folliculitis due to inflammatory skin diseases  Certain uncommon inflammatory skin diseases may cause permanent hair loss and scarring because of deep seated sterile folliculitis. These include:  Lichen planus  Discoid lupus erythematosus  Folliculitis decalvans  Folliculitis keloidalis     Treatment depends on the underlying condition and its severity. A skin biopsy is often necessary to establish the diagnosis.    Acne variants   Acne and acne-like or acneform disorders are also forms of folliculitis. These include:  Acne vulgaris  Nodulocystic acne  Rosacea  Scalp folliculitis  Chloracne    The follicular occlusion syndrome refers to:  Hidradenitis suppurativa (acne inversa)  Acne conglobata  (a severe form of nodulocystic acne)  Dissecting cellulitis (perifolliculitis capitis abscedens et suffodiens)  Pilonidal sinus.    Treatment of the acne variants may include topical therapy as well as long courses of tetracycline antibiotics, isotretinoin (vitamin-A derivative) and in women, antiandrogenic therapy.    Buttock folliculitis  Folliculitis affecting the buttocks is quite common and is often nonspecific, ie no specific cause is found. Buttock folliculitis is equally common in males and females.  Acute buttock folliculitis is usually bacterial in origin (like boils), resulting in red painful papules and pustules. It clears with antibiotics.  Chronic buttock folliculitis does not often cause significant symptoms but it can be very persistent. Although antiseptics, topical acne treatments, peeling agents such as alphahydroxy acids, long courses of oral antibiotics and isotretinoin can help buttock folliculitis, they are not always effective. Hair removal might be worth trying if the affected area is hairy. As regrowth of hair can make it worse, permanent hair reduction by laser or intense pulsed light (IPL) is best.    MILIUM     Assessment and Plan:  Based on a thorough discussion of this condition and the management approach to it (including a comprehensive discussion of the known risks, side effects and potential benefits of treatment), the patient (family) agrees to implement the following specific plan:  Start Neutrogena Retinol Pro Plus      Assessment and Plan  A milium is a small cyst containing keratin (the skin protein); they are usually multiple and are then known as milia. These harmless cysts present as tiny pearly-white bumps just under the surface of the skin.  Milia are common in all ages and both sexes. They most often arise on the face and are particularly prominent on the eyelids and cheeks, but they may occur elsewhere.  There are various kinds of milia.   milia: Affect 40-50%  of  babies, few to numerous lesions, often seen on the nose, but may also arise inside the mouth on the mucosa (Luis pearls) or palate (Marguerite nodules) or more widely on the scalp, face and upper trunk, Heal spontaneously within a few weeks of birth.  Primary milia in children and adults: found around eyelids, cheeks, forehead and genitalia, in young children, a row of milia may appear along the nasal crease, may clear in a few weeks or persist for months or longer.    Juvenile milia: associated with Rombo syndrome, basal cell naevus syndrome, Mddvb-Qodao-Blhtxvro syndrome, pachyonychia congenita, Espitia syndrome and other genetic disorders, may be congenital (present at birth) or appear later in life.  Milia en plaque: multiple milia appear on within an inflamed plaque up to several centimeters in diameter, usually found on an eyelid, behind the ear, on a cheek or jaw.  3. Affect children and adults, especially middle-aged women.  4. Sometimes associated with another skin disease including pseudoxanthoma elasticum, discoid lupus erythematosus, lichen planus.  Multiple eruptive milia: crops of numerous milia appear over a few weeks to months, lesions may be asymptomatic or itchy, most often affect the face, upper arms and upper trunk.  Traumatic milia: occur at the site of injury as skin heals, arise from eccrine sweat ducts, examples include thermal burns, dermabrasion, blistering rashes such as bullous pemphigoid, often seen on the back of hands and fingers in porphyria cutanea tarda, a milia-like calcified nodule may develop after  heel stick blood test.   Milia associated with drugs: may rarely follow the use of topical medication, such as phenols, hydroquinone, 5-fluorouracil cream, and a corticosteroid.    Milia have a characteristic appearance. However, on occasion, a skin biopsy may be performed. This shows a small epidermoid cyst coming from a vellus hair follicle.  Milia should be  distinguished from other types of cyst, comedones, xanthelasma and syringomas. Colloid milia are grossman coloured bumps on cheeks and temples associated with excessive exposure to sunlight.  They should also be distinguished from milia-like cysts noted on dermoscopy in seborrhoeic keratoses, papillomatous moles and some basal cell carcinomas.  Milia do not need to be treated unless they are a cause for concern for the patient. They often clear up by themselves within a few months. Where possible, further trauma should be minimised to reduce the development of new lesions.  The lesion may be de-roofed using a sterile needle or blade and the contents squeezed or pricked out.  They may be destroyed using diathermy and curettage, or cryotherapy.  For widespread lesions, topical retinoids may be helpful.  Chemical peels, dermabrasion and laser ablation have been reported to be effective when used for very extensive milia.  Milia en plaque may improve with minocycline (a tetracycline antibiotic).

## 2024-07-18 NOTE — PROGRESS NOTES
"[facility] D[facility]atology [facility] Note     Patient Name: Janie Kaur  Encounter Date: 7/18/24     Have you been cared for by a [facility] Dermatologist in the last 3 years and, if so, which description applies to you?    Yes.  I have been here within the last 3 years, and my medical history has NOT changed since that time.  I am FEMALE/of child-bearing potential.    REVIEW OF SYSTEMS:  Have you recently had or currently have any of the following? No changes in my recent health.   PAST MEDICAL HISTORY:  Have you personally ever had or currently have any of the following?  If \"YES,\" then please provide more detail. No changes in my medical history.   HISTORY OF IMMUNOSUPPRESSION: Do you have a history of any of the following:  Systemic Immunosuppression such as Diabetes, Biologic or Immunotherapy, Chemotherapy, Organ Transplantation, Bone Marrow Transplantation?  No     Answering \"YES\" requires the addition of the dotphrase \"IMMUNOSUPPRESSED\" as the first diagnosis of the patient's visit.   FAMILY HISTORY:  Any \"first degree relatives\" (parent, brother, sister, or child) with the following?    No changes in my family's known health.   PATIENT EXPERIENCE:    Do you want the Dermatologist to perform a COMPLETE skin exam today including a clinical examination under the \"bra and underwear\" areas?  Yes  If necessary, do we have your permission to call and leave a detailed message on your Preferred Phone number that includes your specific medical information?  Yes      Allergies   Allergen Reactions   • Gluten Meal - Food Allergy GI Intolerance     Other Reaction(s): bowel issues   • Benzoyl Peroxide Rash     Other reaction(s): itching, swelling  Other reaction(s): itching, swelling        Current Outpatient Medications:   •  albuterol (ProAir HFA) 90 mcg/act inhaler, Inhale 2 puffs every 6 (six) hours as needed, Disp: , Rfl:   •  amphetamine-dextroamphetamine (ADDERALL XR) 10 MG 24 hr capsule, , Disp: , Rfl:   •  Azelaic " Acid (Finacea) 15 % FOAM, Apply topically once or twice a day to a face area., Disp: 50 g, Rfl: 5  •  Azelaic Acid 15 % cream, Apply 1 Application topically daily SM5 Azelaic Acid 15%/Niacinamide 2% cream, Disp: 30 g, Rfl: 3  •  clindamycin (CLEOCIN T) 1 %, Apply 1 Pad topically 2 (two) times a day, Disp: 30 Pad, Rfl: 5  •  fluticasone (FLONASE) 50 mcg/act nasal spray, 1 spray into each nostril daily Using as needed, Disp: , Rfl:   •  Junel FE 1.5/30 1.5-30 MG-MCG tablet, ONE PILL DAILY, SKIP THE INACTIVE PILLS AND RESTART A NEW PACK IMMEDIATELY, Disp: , Rfl:   •  levothyroxine 50 mcg tablet, levothyroxine 50 mcg tablet, Disp: , Rfl:   •  spironolactone (ALDACTONE) 100 mg tablet, Take 1 tablet (100 mg total) by mouth daily, Disp: 30 tablet, Rfl: 5  •  clindamycin (CLEOCIN T) 1 % lotion, Apply topically 2 (two) times a day Apply topically to affected areas twice a day. (Patient not taking: Reported on 1/23/2024), Disp: 60 mL, Rfl: 5  •  Multiple Vitamin (MULTI-VITAMIN DAILY PO), Take by mouth (Patient not taking: Reported on 1/23/2024), Disp: , Rfl:           Whom besides the patient is providing clinical information about today's encounter?   NO ADDITIONAL HISTORIAN (patient alone provided history)    Physical Exam and Assessment/Plan by Diagnosis:    SEBORRHEIC KERATOSES  - Relevant exam: Scattered over the trunk/extremities are waxy brown to black plaques and papules with stuck on appearance and consistent dermoscopy  - Exam and clinical history consistent with seborrheic keratoses  - Counseled that these are benign growths that do not require treatment    MELANOCYTIC NEVI  -Relevant exam: Scattered over the trunk/extremities are homogenously pigmented brown macules and papules. ELM performed and without concerning findings. No outliers unless otherwise noted in today's note  - Exam and clinical history consistent with melanocytic nevi  - Counseled to return to clinic prior to scheduled appointment should any of  these lesions change or should any new lesions of concern arise  - Counseled on use of sun protection daily. Reviewed latest FDA sunscreen guidelines, including use of broad spectrum (UVA and UVB blocking) sunscreen or sun protective clothing with SPF 30-50 every 2-3 hours and reapplied after exposure to water    LENTIGINES  OTHER SKIN CHANGES DUE TO CHRONIC EXPOSURE TO NONIONIZING RADIATION  - Relevant exam: Over sun exposed areas are brown macules. ELM performed and without concerning findings.  - Exam and clinical history consistent with lentigines.  - Counseled to return to clinic prior to scheduled appointment should any of these lesions change or should any new lesions of concern arise.  - Recommended use of sunscreen as above and below.    CHERRY ANGIOMAS  - Relevant exam: Scattered over the trunk/extremities are red papules  - Exam and clinical history consistent with cherry angiomas  - Educated that these are benign    FOLLICULITIS    Physical Exam:  Anatomic Location Affected:  Buttocks  Morphological Description:  Few follicular papule  Pertinent Positives:  Pertinent Negatives:    Additional History of Present Condition:  Patient reports increased improvement    Assessment and Plan:  Based on a thorough discussion of this condition and the management approach to it (including a comprehensive discussion of the known risks, side effects and potential benefits of treatment), the patient (family) agrees to implement the following specific plan:  Continue Spironolactone 100 mg daily.   Significant improvement .  Tolerating increase dose without adverse effect.    What is folliculitis?  Folliculitis is the name given to a group of skin conditions in which there are inflamed hair follicles. The result is a tender red spot, often with a surface pustule.  Folliculitis may be superficial or deep. It can affect anywhere there are hairs, including chest, back, buttocks, arms and legs. Acne and its variants are also  types of folliculitis.    What causes folliculitis?  Folliculitis can be due to infection, occlusion (blockage), irritation and various skin diseases.    Folliculitis due to infection  To determine if folliculitis is due to an infection, swabs should be taken from the pustules for cytology and culture in the laboratory.    Bacteria  Bacterial folliculitis is commonly due to Staphylococcus aureus. If the infection involves the deep part of the follicle, it results in a painful boil. Recommended treatment includes careful hygiene, antiseptic cleanser or cream, antibiotic ointment, and/or oral antibiotics.    Spa pool folliculitis is due to infection with Pseudomonas aeruginosa, which thrives in inadequately chlorinated warm water. Gram negative folliculitis is a pustular facial eruption also due to infection with Pseudomonas aeruginosa or other similar organisms. When it appears, it usually follows tetracycline treatment of acne, but is quite rare.    Yeasts  The most common yeast to cause a folliculitis is Pityrosporum ovale, also known as Malassezia. Malassezia folliculitis (Pityrosporum folliculitis) is an itchy acne-like condition usually affecting the upper trunk of a young adult. Treatment includes avoiding moisturisers, stopping any antibiotics and topical antifungal or oral antifungal medication for several weeks.    Candida albicans can also provoke a folliculitis in skin folds (intertrigo) or in the beard area. It is treated with topical or oral antifungal agents.    Fungi  Ringworm of the scalp (tinea capitis) usually results in scaling and hair loss, but sometimes results in folliculitis. In [place], cat ringworm (Microsporum canis) is the commonest organism causing scalp fungal infection. Other fungi such as Trichophyton tonsurans are increasingly reported. Treatment is with oral antifungal agents for several months.    Viral infections  Folliculitis may caused by herpes simplex virus. This tends to  be tender, and resolves without treatment in around 10 days. Severe recurrent attacks may be treated with acyclovir and other antiviral agents.    Herpes zoster (the cause of shingles) may also present as folliculitis with painful pustules and crusted spots within a dermatome (an area of skin supplied by a single nerve). It is treated with hihg-dose acyclovir.  Molluscum contagiosum, common in young children, may also cause follicular umbilicated papules, usually clustered in and around a body fold. Molluscum may provoke dermatitis.    Parasitic infection  Folliculitis on the face or scalp of older or immunosuppressed adults may be due to colonisation by hair follicle mites (demodex). This is known as demodicosis.  The human infestation, scabies, often provokes folliculitis, as well as non-follicular papules, vesicles and pustules.    Folliculitis due to irritation from regrowing hairs  Folliculitis may arise as hairs regrow after shaving, waxing, electrolysis or plucking. Swabs taken from the pustules are sterile ie there is no growth of bacteria or other organisms. In the beard area irritant folliculitis is known as pseudofolliculitis barbae.  Irritant folliculitis is also common on the lower legs of women (shaving rash). It is frequently very itchy. Treatment is by stopping hair removal, and not beginning again for about three months after the folliculitis has settled. To prevent reoccurring irritant folliculitis, use a gentle hair removal method, such as a lady's electric razor. Avoid soap and apply plenty of shaving gel, if using a blade shaver.    Folliculitis due to contact reactions  Occlusion  Paraffin-based ointments, moisturisers, and adhesive plasters may all result in a sterile folliculitis. If a moisturiser is needed, choose an oil-free product, as it is less likely to cause occlusion.    Chemicals  Coal tar, cutting oils and other chemicals may cause an irritant folliculitis. Avoid contact with the  causative product.    Topical steroids  Overuse of topical steroids may produce a folliculitis. Perioral dermatitis is a facial folliculitis provoked by moisturisers and topical steroids. Perioral dermatitis is treated with tetracycline antibiotics for six weeks or so.    Folliculitis due to immunosuppression  Eosinophilic folliculitis is a specific type of folliculitis that may arise in some immune suppressed individuals such as those infected by human immunodeficiency virus (HIV) or those who have cancer.    Folliculitis due to drugs  Folliculitis may be due to drugs, particularly corticosteroids (steroid acne), androgens (male hormones), ACTH, lithium, isoniazid (INH), phenytoin and B-complex vitamins. Protein kinase inhibitors (epidermal growth factor receptor inhibitors) and targeted therapy for metastatic melanoma (vemurafenib, dabrafenib) nearly always result in folliculitis.    Folliculitis due to inflammatory skin diseases  Certain uncommon inflammatory skin diseases may cause permanent hair loss and scarring because of deep seated sterile folliculitis. These include:  Lichen planus  Discoid lupus erythematosus  Folliculitis decalvans  Folliculitis keloidalis     Treatment depends on the underlying condition and its severity. A skin biopsy is often necessary to establish the diagnosis.    Acne variants   Acne and acne-like or acneform disorders are also forms of folliculitis. These include:  Acne vulgaris  Nodulocystic acne  Rosacea  Scalp folliculitis  Chloracne    The follicular occlusion syndrome refers to:  Hidradenitis suppurativa (acne inversa)  Acne conglobata (a severe form of nodulocystic acne)  Dissecting cellulitis (perifolliculitis capitis abscedens et suffodiens)  Pilonidal sinus.    Treatment of the acne variants may include topical therapy as well as long courses of tetracycline antibiotics, isotretinoin (vitamin-A derivative) and in women, antiandrogenic therapy.    Buttock  folliculitis  Folliculitis affecting the buttocks is quite common and is often nonspecific, ie no specific cause is found. Buttock folliculitis is equally common in males and females.  Acute buttock folliculitis is usually bacterial in origin (like boils), resulting in red painful papules and pustules. It clears with antibiotics.  Chronic buttock folliculitis does not often cause significant symptoms but it can be very persistent. Although antiseptics, topical acne treatments, peeling agents such as alphahydroxy acids, long courses of oral antibiotics and isotretinoin can help buttock folliculitis, they are not always effective. Hair removal might be worth trying if the affected area is hairy. As regrowth of hair can make it worse, permanent hair reduction by laser or intense pulsed light (IPL) is best.    MILIUM     Physical Exam:  Anatomic Location Affected:  Face  Morphological Description:  fine milial under eyelid bilaterally  Pertinent Positives:  Pertinent Negatives:    Additional History of Present Condition:  Present on exam.     Assessment and Plan:  Based on a thorough discussion of this condition and the management approach to it (including a comprehensive discussion of the known risks, side effects and potential benefits of treatment), the patient (family) agrees to implement the following specific plan:  Start Neutrogena Retinol Pro Plus      Assessment and Plan  A milium is a small cyst containing keratin (the skin protein); they are usually multiple and are then known as milia. These harmless cysts present as tiny pearly-white bumps just under the surface of the skin.  Milia are common in all ages and both sexes. They most often arise on the face and are particularly prominent on the eyelids and cheeks, but they may occur elsewhere.  There are various kinds of milia.   milia: Affect 40-50% of  babies, few to numerous lesions, often seen on the nose, but may also arise inside the mouth  on the mucosa (Luis pearls) or palate (Marguerite nodules) or more widely on the scalp, face and upper trunk, Heal spontaneously within a few weeks of birth.  Primary milia in children and adults: found around eyelids, cheeks, forehead and genitalia, in young children, a row of milia may appear along the nasal crease, may clear in a few weeks or persist for months or longer.    Juvenile milia: associated with Rombo syndrome, basal cell naevus syndrome, Ugttk-[id number]-Jfqxwiyv syndrome, pachyonychia congenita, Espitia syndrome and other genetic disorders, may be congenital (present at birth) or appear later in life.  Milia en plaque: multiple milia appear on within an inflamed plaque up to several centimeters in diameter, usually found on an eyelid, behind the ear, on a cheek or jaw.  3. Affect children and adults, especially middle-aged women.  4. Sometimes associated with another skin disease including pseudoxanthoma elasticum, discoid lupus erythematosus, lichen planus.  Multiple eruptive milia: crops of numerous milia appear over a few weeks to months, lesions may be asymptomatic or itchy, most often affect the face, upper arms and upper trunk.  Traumatic milia: occur at the site of injury as skin heals, arise from eccrine sweat ducts, examples include thermal burns, dermabrasion, blistering rashes such as bullous pemphigoid, often seen on the back of hands and fingers in porphyria cutanea tarda, a milia-like calcified nodule may develop after  heel stick blood test.   Milia associated with drugs: may rarely follow the use of topical medication, such as phenols, hydroquinone, 5-fluorouracil cream, and a corticosteroid.    Milia have a characteristic appearance. However, on occasion, a skin biopsy may be performed. This shows a small epidermoid cyst coming from a vellus hair follicle.  Milia should be distinguished from other types of cyst, comedones, xanthelasma and syringomas. Colloid milia are grossman coloured  bumps on cheeks and temples associated with excessive exposure to sunlight.  They should also be distinguished from milia-like cysts noted on dermoscopy in seborrhoeic keratoses, papillomatous moles and some basal cell carcinomas.  Milia do not need to be treated unless they are a cause for concern for the patient. They often clear up by themselves within a few months. Where possible, further trauma should be minimised to reduce the development of new lesions.  The lesion may be de-roofed using a sterile needle or blade and the contents squeezed or pricked out.  They may be destroyed using diathermy and curettage, or cryotherapy.  For widespread lesions, topical retinoids may be helpful.  Chemical peels, dermabrasion and laser ablation have been reported to be effective when used for very extensive milia.  Milia en plaque may improve with minocycline (a tetracycline antibiotic).      Scribe Attestation    I,:  Jimmy Conroy am acting as a scribe while in the presence of the attending physician.:       I,:  Todd Singh MD personally performed the services described in this documentation    as scribed in my presence.:

## 2024-08-24 NOTE — PROGRESS NOTES
[facility] Follow Up Note    Patient Name: Ahsan Garcia  Encounter Date: 01/25/2022    Today's Chief Concerns:   Concern #1:  Follow up Acne and folliculitis       Current Medications:    Current Outpatient Medications:     amphetamine-dextroamphetamine (ADDERALL XR) 10 MG 24 hr capsule, dextroamphetamine-amphetamine ER 10 mg 24hr capsule,extend release, Disp: , Rfl:     Azelaic Acid (Finacea) 15 % FOAM, Apply topically twice a day, Disp: 50 g, Rfl: 3    clindamycin (CLEOCIN T) 1 % lotion, Apply topically 2 (two) times a day, Disp: 60 mL, Rfl: 3    fluticasone (FLONASE) 50 mcg/act nasal spray, 1 spray into each nostril daily Using as needed, Disp: , Rfl:     Junel FE 1 5/30 1 5-30 MG-MCG tablet, ONE PILL DAILY, SKIP THE INACTIVE PILLS AND RESTART A NEW PACK IMMEDIATELY, Disp: , Rfl:     levothyroxine 50 mcg tablet, levothyroxine 50 mcg tablet, Disp: , Rfl:     CONSTITUTIONAL:   Vitals:    01/25/22 0905   Temp: 99 °F (37 2 °C)   TempSrc: Temporal   Weight: 81 6 kg (180 lb)   Height: 5' 8" (1 727 m)       Specific Alerts:     Have you been seen by a [facility] Dermatologist in the last 3 years? YES    Are you pregnant or planning to become pregnant? No    Are you currently or planning to be nursing or breast feeding? No    Allergies   Allergen Reactions    Benzoyl Peroxide Rash     Other reaction(s): itching, swelling  Other reaction(s): itching, swelling         May we call your Preferred Phone number to discuss your specific medical information? YES    May we leave a detailed message that includes your specific medical information? YES    Have you traveled outside of the [facility] in the past 3 months? No    Do you currently have a pacemaker or defibrillator? No    Do you have any artificial heart valves, joints, plates, screws, rods, stents, pins, etc? No   - If Yes, were any placed within the last 2 years?     Do you require any medications prior to a surgical procedure? No   - If Yes, for which procedure? - If Yes, what medications to you require? Are you taking any medications that cause you to bleed more easily ("blood thinners") No    Have you ever experienced a rapid heartbeat with epinephrine? No    Have you ever been treated with "gold" (gold sodium thiomalate) therapy? No    [facility] can help with wrinkles, "laugh lines," facial volume loss, "double chin," "love handles," age spots, and more  Are you interested in learning today about some of the skin enhancement procedures that we offer? (If Yes, please provide more detail) No    Review of Systems:  Have you recently had or currently have any of the following?     · Fever or chills: No  · Night Sweats: YES  · Headaches: YES  · Weight Gain: No  · Weight Loss: No  · Blurry Vision: No  · Nausea: No  · Vomiting: No  · Diarrhea: No  · Blood in Stool: No  · Abdominal Pain: No  · Itchy Skin: No  · Painful Joints: No  · Swollen Joints: No  · Muscle Pain: No  · Irregular Mole: No  · Sun Burn: No  · Dry Skin: YES  · Skin Color Changes: No  · Scar or Keloid: No  · Cold Sores/Fever Blisters: No  · Bacterial Infections/MRSA: No  · Anxiety: No  · Depression: No  · Suicidal or Homicidal Thoughts: No      PSYCH: Normal mood and affect  EYES: Normal conjunctiva  ENT: Normal lips and oral mucosa  CARDIOVASCULAR: No edema  RESPIRATORY: Normal respirations  HEME/LYMPH/IMMUNO:  No regional lymphadenopathy except as noted below in ASSESSMENT AND PLAN BY DIAGNOSIS    FULL ORGAN SYSTEM SKIN EXAM (SKIN)   Hair, Scalp, Ears, Face Normal except as noted below in Assessment   Neck, Cervical Chain Nodes Normal except as noted below in Assessment   Right Arm/Hand/Fingers Normal except as noted below in Assessment   Left Arm/Hand/Fingers Normal except as noted below in Assessment   Chest/Breasts/Axillae Viewed areas Normal except as noted below in Assessment   Abdomen, Umbilicus Normal except as noted below in Assessment Back/Spine Normal except as noted below in Assessment   Groin/Genitalia/Buttocks Viewed areas Normal except as noted below in Assessment   Right Leg, Foot, Toes Normal except as noted below in Assessment   Left Leg, Foot, Toes Normal except as noted below in Assessment         1  ACNE VULGARIS ("COMMON ACNE")    Physical Exam:   Psychiatric/Mood:   Anatomic Location Affected: Face (T zone area)   Morphological Description:  o Open/Closed Comedones:  - Rare ("Almost Clear")  o Inflammatory Papules/Pustules:  - Rare ("Almost Clear")  o Nodules:  - No evidence ("Clear")  o Scarring:  - No evidence ("Clear")  o Excoriations:  - No evidence ("Clear")  o Local Skin Redness/Erythema:  - Rare ("Almost Clear")  o Local Skin Dryness/Scaling:  - No evidence ("Clear")  o Local Skin Dyspigmentation:  - No evidence ("Clear")   Pertinent Positives:   Pertinent Negatives: Additional History of Present Condition:  Patient is still using prescribed azelaic acid foam wash and Clindamycin lotion for acne  Patient notes improvements and needs refill to continue treating  Assessment and Plan:   We reviewed the causes of acne, the kinds of acne, and the expected clinical course   We discussed treatment options ranging from over-the-counter products, topical retinoids, antibiotics, BP, hormonal therapies (OCPs/spironolactone), and isotretinoin (Accutane)   We reviewed specific over-the-counter interventions and medications  Recommended typical hygiene measures including water-based facial products, washing regularly with mild cleanser, and refraining from picking and popping any pimples   Recommended non-comedogenic sunscreen use daily   Expectations of therapy discussed  Side effects, risks and benefits of medications discussed   A comprehensive handout on Acne was provided     The phone number to call in case of questions or concerns (and instructions to stop medications in such a scenario) was provided   After lengthy discussion of etiology and treatment options, we decided to implement the following personalized treatment plan:    Based on a thorough discussion of this condition and the management approach to it (including a comprehensive discussion of the known risks, side effects and potential benefits of treatment), the patient (family) agrees to implement the following specific plan:    --------------------------------------------------------------------------------------  YOUR PERSONALIZED ACNE ACTION PLAN    [address]    1) SKIN HYGIENE:  In the shower, wash your face, chest and back gently with Cetaphil moisturizing cleanser or Dove Fragrance-free bar  Do not use a luffa or washcloth as these tend to be too irritating to acne-prone skin  2) ANTIBIOTICS:     Continue using prescribed Finacea foam (Azelaic acid)  Apply topically once or twice a day to face area         2  FOLLICULITIS    Physical Exam:   Anatomic Location Affected:  Buttock    Morphological Description:  Much improved, scattered re perifollicular papules   Pertinent Positives:   Pertinent Negatives: Additional History of Present Condition:  Patient is using prescribed clindamycin 1% lotion currently  In the past she was using benzoyl peroxide wash and Hibiclens wash  Patient notes improvements but there's still active bumps forming  Assessment and Plan:  Based on a thorough discussion of this condition and the management approach to it (including a comprehensive discussion of the known risks, side effects and potential benefits of treatment), the patient (family) agrees to implement the following specific plan:   Continue using benzoyl peroxide (neutrogena clear pore) in the shower daily or as needed   Continue using clindamycin 1% lotion  Apply topically after showering  Start using Amlactin ultra smoothing cream to rough dry areas as needed to moisturize          Obtained a wound culture of the nose to check for any bacterial growth (staph infection)  What is folliculitis? Folliculitis is the name given to a group of skin conditions in which there are inflamed hair follicles  The result is a tender red spot, often with a surface pustule  Folliculitis may be superficial or deep  It can affect anywhere there are hairs, including chest, back, buttocks, arms and legs  Acne and its variants are also types of folliculitis  What causes folliculitis? Folliculitis can be due to infection, occlusion (blockage), irritation and various skin diseases  Folliculitis due to infection  To determine if folliculitis is due to an infection, swabs should be taken from the pustules for cytology and culture in the laboratory  Bacteria  Bacterial folliculitis is commonly due to Staphylococcus aureus  If the infection involves the deep part of the follicle, it results in a painful boil  Recommended treatment includes careful hygiene, antiseptic cleanser or cream, antibiotic ointment, and/or oral antibiotics  Spa pool folliculitis is due to infection with Pseudomonas aeruginosa, which thrives in inadequately chlorinated warm water  Gram negative folliculitis is a pustular facial eruption also due to infection with Pseudomonas aeruginosa or other similar organisms  When it appears, it usually follows tetracycline treatment of acne, but is quite rare  Yeasts  The most common yeast to cause a folliculitis is Pityrosporum ovale, also known as Malassezia  Malassezia folliculitis (Pityrosporum folliculitis) is an itchy acne-like condition usually affecting the upper trunk of a young adult  Treatment includes avoiding moisturisers, stopping any antibiotics and topical antifungal or oral antifungal medication for several weeks  Candida albicans can also provoke a folliculitis in skin folds (intertrigo) or in the beard area  It is treated with topical or oral antifungal agents      Fungi  Ringworm of the scalp (tinea capitis) usually results in scaling and hair loss, but sometimes results in folliculitis  In [place], cat ringworm (Microsporum canis) is the commonest organism causing scalp fungal infection  Other fungi such as Trichophyton tonsurans are increasingly reported  Treatment is with oral antifungal agents for several months  Viral infections  Folliculitis may caused by herpes simplex virus  This tends to be tender, and resolves without treatment in around 10 days  Severe recurrent attacks may be treated with acyclovir and other antiviral agents  Herpes zoster (the cause of shingles) may also present as folliculitis with painful pustules and crusted spots within a dermatome (an area of skin supplied by a single nerve)  It is treated with hihg-dose acyclovir  Molluscum contagiosum, common in young children, may also cause follicular umbilicated papules, usually clustered in and around a body fold  Molluscum may provoke dermatitis  Parasitic infection  Folliculitis on the face or scalp of older or immunosuppressed adults may be due to colonisation by hair follicle mites (demodex)  This is known as demodicosis  The human infestation, scabies, often provokes folliculitis, as well as non-follicular papules, vesicles and pustules  Folliculitis due to irritation from regrowing hairs  Folliculitis may arise as hairs regrow after shaving, waxing, electrolysis or plucking  Swabs taken from the pustules are sterile ie there is no growth of bacteria or other organisms  In the beard area irritant folliculitis is known as pseudofolliculitis barbae  Irritant folliculitis is also common on the lower legs of women (shaving rash)  It is frequently very itchy  Treatment is by stopping hair removal, and not beginning again for about three months after the folliculitis has settled  To prevent reoccurring irritant folliculitis, use a gentle hair removal method, such as a lady's electric razor   Avoid soap and apply plenty of shaving gel, if using a blade shaver  Folliculitis due to contact reactions  Occlusion  Paraffin-based ointments, moisturisers, and adhesive plasters may all result in a sterile folliculitis  If a moisturiser is needed, choose an oil-free product, as it is less likely to cause occlusion  Chemicals  Coal tar, cutting oils and other chemicals may cause an irritant folliculitis  Avoid contact with the causative product  Topical steroids  Overuse of topical steroids may produce a folliculitis  Perioral dermatitis is a facial folliculitis provoked by moisturisers and topical steroids  Perioral dermatitis is treated with tetracycline antibiotics for six weeks or so  Folliculitis due to immunosuppression  Eosinophilic folliculitis is a specific type of folliculitis that may arise in some immune suppressed individuals such as those infected by human immunodeficiency virus (HIV) or those who have cancer  Folliculitis due to drugs  Folliculitis may be due to drugs, particularly corticosteroids (steroid acne), androgens (male hormones), ACTH, lithium, isoniazid (INH), phenytoin and B-complex vitamins  Protein kinase inhibitors (epidermal growth factor receptor inhibitors) and targeted therapy for metastatic melanoma (vemurafenib, dabrafenib) nearly always result in folliculitis  Folliculitis due to inflammatory skin diseases  Certain uncommon inflammatory skin diseases may cause permanent hair loss and scarring because of deep seated sterile folliculitis  These include:   Lichen planus   Discoid lupus erythematosus   Folliculitis decalvans   Folliculitis keloidalis     Treatment depends on the underlying condition and its severity  A skin biopsy is often necessary to establish the diagnosis  Acne variants   Acne and acne-like or acneform disorders are also forms of folliculitis   These include:   Acne vulgaris   Nodulocystic acne   Rosacea   Scalp folliculitis   Chloracne    The follicular occlusion syndrome refers to:   Hidradenitis suppurativa (acne inversa)   Acne conglobata (a severe form of nodulocystic acne)   Dissecting cellulitis (perifolliculitis capitis abscedens et suffodiens)   Pilonidal sinus  Treatment of the acne variants may include topical therapy as well as long courses of tetracycline antibiotics, isotretinoin (vitamin-A derivative) and in women, antiandrogenic therapy  Buttock folliculitis  Folliculitis affecting the buttocks is quite common and is often nonspecific, ie no specific cause is found  Buttock folliculitis is equally common in males and females   Acute buttock folliculitis is usually bacterial in origin (like boils), resulting in red painful papules and pustules  It clears with antibiotics   Chronic buttock folliculitis does not often cause significant symptoms but it can be very persistent  Although antiseptics, topical acne treatments, peeling agents such as alphahydroxy acids, long courses of oral antibiotics and isotretinoin can help buttock folliculitis, they are not always effective  Hair removal might be worth trying if the affected area is hairy   As regrowth of hair can make it worse, permanent hair reduction by laser or intense pulsed light (IPL) is best     Scribe Attestation    I,:  Masoud Macdonald MA am acting as a scribe while in the presence of the attending physician :       I,:  Lore Ruby MD personally performed the services described in this documentation    as scribed in my presence : [Fever] : no fever [Chills] : no chills [Nasal Discharge] : nasal discharge [Sore Throat] : sore throat [Chest Pain] : chest pain [Palpitations] : no palpitations [Shortness Of Breath] : no shortness of breath [Cough] : cough [Anxiety] : anxiety

## 2024-12-30 DIAGNOSIS — L71.9 ROSACEA: ICD-10-CM

## 2024-12-30 DIAGNOSIS — L73.9 FOLLICULITIS: ICD-10-CM

## 2024-12-30 DIAGNOSIS — L70.0 ACNE VULGARIS: ICD-10-CM

## 2024-12-30 RX ORDER — AZELAIC ACID 0.15 G/G
AEROSOL, FOAM TOPICAL
Qty: 50 G | Refills: 3 | Status: SHIPPED | OUTPATIENT
Start: 2024-12-30

## 2024-12-30 NOTE — TELEPHONE ENCOUNTER
Patient called to check if this script could be refilled before end of this year since she's met her deductible.     Original script order  per pharmacy and stated she needs a new order. She had one more refill pended but unable to pick it up.    Please review pended order. [facility] #[id number] - [place] - [id number] [id number]    I also schedule a follow up with Sonya turner with Dr. Singh she wanted to also see him. I offered 2025. Please advise if he will be present since there's days he is not available.     If not she will keep 2025 with Dr. Perera   Calm

## 2025-04-24 DIAGNOSIS — L71.9 ROSACEA: ICD-10-CM

## 2025-04-24 DIAGNOSIS — L70.0 ACNE VULGARIS: ICD-10-CM

## 2025-04-24 DIAGNOSIS — Z79.899 HIGH RISK MEDICATION USE: ICD-10-CM

## 2025-04-24 DIAGNOSIS — L73.9 FOLLICULITIS: ICD-10-CM

## 2025-04-24 RX ORDER — SPIRONOLACTONE 100 MG/1
100 TABLET, FILM COATED ORAL DAILY
Qty: 30 TABLET | Refills: 5 | Status: SHIPPED | OUTPATIENT
Start: 2025-04-24